# Patient Record
Sex: FEMALE | HISPANIC OR LATINO | Employment: OTHER | URBAN - METROPOLITAN AREA
[De-identification: names, ages, dates, MRNs, and addresses within clinical notes are randomized per-mention and may not be internally consistent; named-entity substitution may affect disease eponyms.]

---

## 2018-11-07 ENCOUNTER — HOSPITAL ENCOUNTER (INPATIENT)
Facility: HOSPITAL | Age: 33
LOS: 2 days | Discharge: HOME | DRG: 638 | End: 2018-11-09
Attending: EMERGENCY MEDICINE | Admitting: INTERNAL MEDICINE
Payer: MEDICAID

## 2018-11-07 DIAGNOSIS — N39.0 URINARY TRACT INFECTION WITHOUT HEMATURIA, SITE UNSPECIFIED: ICD-10-CM

## 2018-11-07 DIAGNOSIS — E11.10 DIABETIC KETOACIDOSIS WITHOUT COMA ASSOCIATED WITH TYPE 2 DIABETES MELLITUS: Primary | ICD-10-CM

## 2018-11-07 PROBLEM — Z79.4 TYPE 2 DIABETES MELLITUS, WITH LONG-TERM CURRENT USE OF INSULIN: Status: ACTIVE | Noted: 2018-11-07

## 2018-11-07 PROBLEM — E87.1 HYPONATREMIA: Status: ACTIVE | Noted: 2018-11-07

## 2018-11-07 PROBLEM — D72.829 LEUKOCYTOSIS: Status: ACTIVE | Noted: 2018-11-07

## 2018-11-07 PROBLEM — R11.2 INTRACTABLE VOMITING WITH NAUSEA: Status: ACTIVE | Noted: 2018-11-07

## 2018-11-07 PROBLEM — E11.9 TYPE 2 DIABETES MELLITUS, WITH LONG-TERM CURRENT USE OF INSULIN: Status: ACTIVE | Noted: 2018-11-07

## 2018-11-07 LAB
ALBUMIN SERPL BCP-MCNC: 3.6 G/DL
ALLENS TEST: ABNORMAL
ALP SERPL-CCNC: 92 U/L
ALT SERPL W/O P-5'-P-CCNC: 17 U/L
ANION GAP SERPL CALC-SCNC: 14 MMOL/L
ANION GAP SERPL CALC-SCNC: 17 MMOL/L
AST SERPL-CCNC: 14 U/L
B-HCG UR QL: NEGATIVE
B-OH-BUTYR BLD STRIP-SCNC: 4.3 MMOL/L
BACTERIA #/AREA URNS AUTO: ABNORMAL /HPF
BASOPHILS # BLD AUTO: 0.06 K/UL
BASOPHILS NFR BLD: 0.4 %
BILIRUB SERPL-MCNC: 1.3 MG/DL
BILIRUB UR QL STRIP: NEGATIVE
BUN SERPL-MCNC: 15 MG/DL
BUN SERPL-MCNC: 18 MG/DL
CALCIUM SERPL-MCNC: 8.6 MG/DL
CALCIUM SERPL-MCNC: 9.5 MG/DL
CHLORIDE SERPL-SCNC: 101 MMOL/L
CHLORIDE SERPL-SCNC: 94 MMOL/L
CLARITY UR REFRACT.AUTO: ABNORMAL
CO2 SERPL-SCNC: 18 MMOL/L
CO2 SERPL-SCNC: 20 MMOL/L
COLOR UR AUTO: YELLOW
CREAT SERPL-MCNC: 0.8 MG/DL
CREAT SERPL-MCNC: 0.9 MG/DL
CTP QC/QA: YES
CTP QC/QA: YES
DELSYS: ABNORMAL
DIFFERENTIAL METHOD: ABNORMAL
EOSINOPHIL # BLD AUTO: 0 K/UL
EOSINOPHIL NFR BLD: 0.2 %
ERYTHROCYTE [DISTWIDTH] IN BLOOD BY AUTOMATED COUNT: 11.7 %
EST. GFR  (AFRICAN AMERICAN): >60 ML/MIN/1.73 M^2
EST. GFR  (AFRICAN AMERICAN): >60 ML/MIN/1.73 M^2
EST. GFR  (NON AFRICAN AMERICAN): >60 ML/MIN/1.73 M^2
EST. GFR  (NON AFRICAN AMERICAN): >60 ML/MIN/1.73 M^2
ESTIMATED AVG GLUCOSE: 237 MG/DL
GLUCOSE SERPL-MCNC: 226 MG/DL
GLUCOSE SERPL-MCNC: 386 MG/DL
GLUCOSE UR QL STRIP: ABNORMAL
HBA1C MFR BLD HPLC: 9.9 %
HCO3 UR-SCNC: 21.3 MMOL/L (ref 24–28)
HCT VFR BLD AUTO: 42.2 %
HGB BLD-MCNC: 14.9 G/DL
HGB UR QL STRIP: ABNORMAL
HYALINE CASTS UR QL AUTO: 0 /LPF
IMM GRANULOCYTES # BLD AUTO: 0.08 K/UL
IMM GRANULOCYTES NFR BLD AUTO: 0.6 %
KETONES UR QL STRIP: ABNORMAL
LACTATE SERPL-SCNC: 1.2 MMOL/L
LEUKOCYTE ESTERASE UR QL STRIP: ABNORMAL
LIPASE SERPL-CCNC: 26 U/L
LYMPHOCYTES # BLD AUTO: 2.7 K/UL
LYMPHOCYTES NFR BLD: 19 %
MCH RBC QN AUTO: 31 PG
MCHC RBC AUTO-ENTMCNC: 35.3 G/DL
MCV RBC AUTO: 88 FL
MICROSCOPIC COMMENT: ABNORMAL
MONOCYTES # BLD AUTO: 0.9 K/UL
MONOCYTES NFR BLD: 6 %
NEUTROPHILS # BLD AUTO: 10.6 K/UL
NEUTROPHILS NFR BLD: 73.8 %
NITRITE UR QL STRIP: NEGATIVE
NRBC BLD-RTO: 0 /100 WBC
PCO2 BLDA: 33 MMHG (ref 35–45)
PH SMN: 7.42 [PH] (ref 7.35–7.45)
PH UR STRIP: 5 [PH] (ref 5–8)
PLATELET # BLD AUTO: 295 K/UL
PMV BLD AUTO: 10.2 FL
PO2 BLDA: 75 MMHG (ref 80–100)
POC BE: -3 MMOL/L
POC MOLECULAR INFLUENZA A AGN: NEGATIVE
POC MOLECULAR INFLUENZA B AGN: NEGATIVE
POC SATURATED O2: 95 % (ref 95–100)
POC TCO2: 22 MMOL/L (ref 23–27)
POCT GLUCOSE: 198 MG/DL (ref 70–110)
POCT GLUCOSE: 235 MG/DL (ref 70–110)
POCT GLUCOSE: 241 MG/DL (ref 70–110)
POCT GLUCOSE: 270 MG/DL (ref 70–110)
POCT GLUCOSE: 273 MG/DL (ref 70–110)
POCT GLUCOSE: 280 MG/DL (ref 70–110)
POCT GLUCOSE: 286 MG/DL (ref 70–110)
POCT GLUCOSE: 352 MG/DL (ref 70–110)
POTASSIUM SERPL-SCNC: 3.3 MMOL/L
POTASSIUM SERPL-SCNC: 4 MMOL/L
PROT SERPL-MCNC: 7.9 G/DL
PROT UR QL STRIP: ABNORMAL
RBC # BLD AUTO: 4.81 M/UL
RBC #/AREA URNS AUTO: 18 /HPF (ref 0–4)
SAMPLE: ABNORMAL
SITE: ABNORMAL
SODIUM SERPL-SCNC: 129 MMOL/L
SODIUM SERPL-SCNC: 135 MMOL/L
SP GR UR STRIP: >=1.03 (ref 1–1.03)
SQUAMOUS #/AREA URNS AUTO: 22 /HPF
URN SPEC COLLECT METH UR: ABNORMAL
WBC # BLD AUTO: 14.38 K/UL
WBC #/AREA URNS AUTO: >100 /HPF (ref 0–5)
YEAST UR QL AUTO: ABNORMAL

## 2018-11-07 PROCEDURE — 99291 CRITICAL CARE FIRST HOUR: CPT

## 2018-11-07 PROCEDURE — 99900035 HC TECH TIME PER 15 MIN (STAT)

## 2018-11-07 PROCEDURE — 25000003 PHARM REV CODE 250: Performed by: PHYSICIAN ASSISTANT

## 2018-11-07 PROCEDURE — 25000003 PHARM REV CODE 250: Performed by: STUDENT IN AN ORGANIZED HEALTH CARE EDUCATION/TRAINING PROGRAM

## 2018-11-07 PROCEDURE — 36415 COLL VENOUS BLD VENIPUNCTURE: CPT

## 2018-11-07 PROCEDURE — 63600175 PHARM REV CODE 636 W HCPCS: Performed by: INTERNAL MEDICINE

## 2018-11-07 PROCEDURE — 96375 TX/PRO/DX INJ NEW DRUG ADDON: CPT

## 2018-11-07 PROCEDURE — S5571 INSULIN DISPOS PEN 3 ML: HCPCS | Performed by: INTERNAL MEDICINE

## 2018-11-07 PROCEDURE — 81025 URINE PREGNANCY TEST: CPT | Performed by: PHYSICIAN ASSISTANT

## 2018-11-07 PROCEDURE — 83036 HEMOGLOBIN GLYCOSYLATED A1C: CPT

## 2018-11-07 PROCEDURE — 87040 BLOOD CULTURE FOR BACTERIA: CPT | Mod: 59

## 2018-11-07 PROCEDURE — 96372 THER/PROPH/DIAG INJ SC/IM: CPT

## 2018-11-07 PROCEDURE — 80053 COMPREHEN METABOLIC PANEL: CPT

## 2018-11-07 PROCEDURE — 20600001 HC STEP DOWN PRIVATE ROOM

## 2018-11-07 PROCEDURE — 63600175 PHARM REV CODE 636 W HCPCS: Performed by: PHYSICIAN ASSISTANT

## 2018-11-07 PROCEDURE — S5571 INSULIN DISPOS PEN 3 ML: HCPCS | Performed by: STUDENT IN AN ORGANIZED HEALTH CARE EDUCATION/TRAINING PROGRAM

## 2018-11-07 PROCEDURE — 85025 COMPLETE CBC W/AUTO DIFF WBC: CPT

## 2018-11-07 PROCEDURE — 99291 CRITICAL CARE FIRST HOUR: CPT | Mod: ,,, | Performed by: EMERGENCY MEDICINE

## 2018-11-07 PROCEDURE — 83605 ASSAY OF LACTIC ACID: CPT

## 2018-11-07 PROCEDURE — S5010 5% DEXTROSE AND 0.45% SALINE: HCPCS | Performed by: STUDENT IN AN ORGANIZED HEALTH CARE EDUCATION/TRAINING PROGRAM

## 2018-11-07 PROCEDURE — 36600 WITHDRAWAL OF ARTERIAL BLOOD: CPT

## 2018-11-07 PROCEDURE — 63600175 PHARM REV CODE 636 W HCPCS: Performed by: STUDENT IN AN ORGANIZED HEALTH CARE EDUCATION/TRAINING PROGRAM

## 2018-11-07 PROCEDURE — 80048 BASIC METABOLIC PNL TOTAL CA: CPT | Mod: 91

## 2018-11-07 PROCEDURE — 80048 BASIC METABOLIC PNL TOTAL CA: CPT

## 2018-11-07 PROCEDURE — 82962 GLUCOSE BLOOD TEST: CPT

## 2018-11-07 PROCEDURE — 87086 URINE CULTURE/COLONY COUNT: CPT

## 2018-11-07 PROCEDURE — 83690 ASSAY OF LIPASE: CPT

## 2018-11-07 PROCEDURE — 82010 KETONE BODYS QUAN: CPT

## 2018-11-07 PROCEDURE — 96361 HYDRATE IV INFUSION ADD-ON: CPT

## 2018-11-07 PROCEDURE — 81001 URINALYSIS AUTO W/SCOPE: CPT

## 2018-11-07 PROCEDURE — 82803 BLOOD GASES ANY COMBINATION: CPT

## 2018-11-07 PROCEDURE — 99223 1ST HOSP IP/OBS HIGH 75: CPT | Mod: ,,, | Performed by: INTERNAL MEDICINE

## 2018-11-07 PROCEDURE — 96374 THER/PROPH/DIAG INJ IV PUSH: CPT | Mod: 59

## 2018-11-07 RX ORDER — ONDANSETRON 2 MG/ML
4 INJECTION INTRAMUSCULAR; INTRAVENOUS
Status: COMPLETED | OUTPATIENT
Start: 2018-11-07 | End: 2018-11-07

## 2018-11-07 RX ORDER — DEXTROSE MONOHYDRATE 100 MG/ML
1000 INJECTION, SOLUTION INTRAVENOUS
Status: DISCONTINUED | OUTPATIENT
Start: 2018-11-07 | End: 2018-11-09 | Stop reason: HOSPADM

## 2018-11-07 RX ORDER — ONDANSETRON 8 MG/1
8 TABLET, ORALLY DISINTEGRATING ORAL EVERY 8 HOURS PRN
Status: DISCONTINUED | OUTPATIENT
Start: 2018-11-07 | End: 2018-11-08

## 2018-11-07 RX ORDER — GLUCAGON 1 MG
1 KIT INJECTION
Status: DISCONTINUED | OUTPATIENT
Start: 2018-11-07 | End: 2018-11-09 | Stop reason: HOSPADM

## 2018-11-07 RX ORDER — INSULIN ASPART 100 [IU]/ML
5 INJECTION, SOLUTION INTRAVENOUS; SUBCUTANEOUS
Status: ON HOLD | COMMUNITY
End: 2019-05-08 | Stop reason: HOSPADM

## 2018-11-07 RX ORDER — CEFTRIAXONE 1 G/1
1 INJECTION, POWDER, FOR SOLUTION INTRAMUSCULAR; INTRAVENOUS
Status: COMPLETED | OUTPATIENT
Start: 2018-11-08 | End: 2018-11-09

## 2018-11-07 RX ORDER — CEFTRIAXONE 1 G/1
1 INJECTION, POWDER, FOR SOLUTION INTRAMUSCULAR; INTRAVENOUS
Status: COMPLETED | OUTPATIENT
Start: 2018-11-07 | End: 2018-11-07

## 2018-11-07 RX ORDER — IBUPROFEN 200 MG
16 TABLET ORAL
Status: DISCONTINUED | OUTPATIENT
Start: 2018-11-07 | End: 2018-11-09 | Stop reason: HOSPADM

## 2018-11-07 RX ORDER — POTASSIUM CHLORIDE 20 MEQ/1
40 TABLET, EXTENDED RELEASE ORAL EVERY 4 HOURS
Status: COMPLETED | OUTPATIENT
Start: 2018-11-07 | End: 2018-11-07

## 2018-11-07 RX ORDER — ENOXAPARIN SODIUM 100 MG/ML
40 INJECTION SUBCUTANEOUS EVERY 24 HOURS
Status: DISCONTINUED | OUTPATIENT
Start: 2018-11-07 | End: 2018-11-09 | Stop reason: HOSPADM

## 2018-11-07 RX ORDER — IBUPROFEN 200 MG
24 TABLET ORAL
Status: DISCONTINUED | OUTPATIENT
Start: 2018-11-07 | End: 2018-11-09 | Stop reason: HOSPADM

## 2018-11-07 RX ORDER — PROMETHAZINE HYDROCHLORIDE 25 MG/1
25 TABLET ORAL EVERY 6 HOURS PRN
Status: DISCONTINUED | OUTPATIENT
Start: 2018-11-07 | End: 2018-11-09 | Stop reason: HOSPADM

## 2018-11-07 RX ORDER — SODIUM CHLORIDE 9 MG/ML
INJECTION, SOLUTION INTRAVENOUS CONTINUOUS
Status: DISCONTINUED | OUTPATIENT
Start: 2018-11-07 | End: 2018-11-07

## 2018-11-07 RX ORDER — DICYCLOMINE HYDROCHLORIDE 10 MG/ML
20 INJECTION INTRAMUSCULAR
Status: COMPLETED | OUTPATIENT
Start: 2018-11-07 | End: 2018-11-07

## 2018-11-07 RX ORDER — INSULIN ASPART 100 [IU]/ML
1-10 INJECTION, SOLUTION INTRAVENOUS; SUBCUTANEOUS
Status: DISCONTINUED | OUTPATIENT
Start: 2018-11-07 | End: 2018-11-09 | Stop reason: HOSPADM

## 2018-11-07 RX ADMIN — DICYCLOMINE HYDROCHLORIDE 20 MG: 20 INJECTION, SOLUTION INTRAMUSCULAR at 02:11

## 2018-11-07 RX ADMIN — SODIUM CHLORIDE 1000 ML: 0.9 INJECTION, SOLUTION INTRAVENOUS at 02:11

## 2018-11-07 RX ADMIN — SODIUM CHLORIDE 3 UNITS/HR: 9 INJECTION, SOLUTION INTRAVENOUS at 03:11

## 2018-11-07 RX ADMIN — INSULIN DETEMIR 30 UNITS: 100 INJECTION, SOLUTION SUBCUTANEOUS at 08:11

## 2018-11-07 RX ADMIN — ENOXAPARIN SODIUM 40 MG: 100 INJECTION SUBCUTANEOUS at 06:11

## 2018-11-07 RX ADMIN — SODIUM CHLORIDE 2.7 UNITS/HR: 9 INJECTION, SOLUTION INTRAVENOUS at 10:11

## 2018-11-07 RX ADMIN — POTASSIUM CHLORIDE: 149 INJECTION, SOLUTION, CONCENTRATE INTRAVENOUS at 06:11

## 2018-11-07 RX ADMIN — SODIUM CHLORIDE 1000 ML: 0.9 INJECTION, SOLUTION INTRAVENOUS at 04:11

## 2018-11-07 RX ADMIN — ONDANSETRON HYDROCHLORIDE 4 MG: 2 INJECTION INTRAMUSCULAR; INTRAVENOUS at 02:11

## 2018-11-07 RX ADMIN — INSULIN DETEMIR 25 UNITS: 100 INJECTION, SOLUTION SUBCUTANEOUS at 10:11

## 2018-11-07 RX ADMIN — CEFTRIAXONE SODIUM 1 G: 1 INJECTION, POWDER, FOR SOLUTION INTRAMUSCULAR; INTRAVENOUS at 03:11

## 2018-11-07 RX ADMIN — POTASSIUM CHLORIDE 40 MEQ: 1500 TABLET, EXTENDED RELEASE ORAL at 10:11

## 2018-11-07 NOTE — ED PROVIDER NOTES
"Encounter Date: 2018       History     Chief Complaint   Patient presents with    Nausea     Pt presented to the ED via pov. Pt c/o nausea and vomiting since .     Emesis     32 y/o WF with history of insulin dependent DM2 presents to the ED c/o n/v since . She reports persistent vomiting and an inability to tolerate po. She last took insulin on Saturday (has not taken any since due to no po intake). She is on levemir 40unit qhs, novolog sliding scale, victoza qam. She reports 1 episode of diarrhea this morning. She endorses subjective fever, chills generalized abdominal pain that she reports is "stuck/cramping" 10/10 and not relieved with emesis, headache, lightheadedness, dry mouth, generalized myalgias. She was treated for a UTI at the end of October (unsure which abx) and continues to have dysuria. She denies any sick contacts. PSHx significant for  x 3. Denies chest pain, SOB, vaginal bleeding, flank pain.       The history is provided by the patient.     Review of patient's allergies indicates:   Allergen Reactions    Bactrim [sulfamethoxazole-trimethoprim] Rash     Past Medical History:   Diagnosis Date    Diabetes mellitus      History reviewed. No pertinent surgical history.  History reviewed. No pertinent family history.  Social History     Tobacco Use    Smoking status: Never Smoker    Smokeless tobacco: Never Used   Substance Use Topics    Alcohol use: No     Frequency: Never    Drug use: No     Review of Systems   Constitutional: Positive for chills and fever (subjective).   HENT: Negative for congestion, rhinorrhea and sore throat.    Eyes: Negative for photophobia and visual disturbance.   Respiratory: Negative for shortness of breath.    Cardiovascular: Negative for chest pain.   Gastrointestinal: Positive for abdominal pain, diarrhea (x1), nausea and vomiting. Negative for constipation.   Genitourinary: Positive for decreased urine volume and dysuria. Negative for " flank pain, hematuria and vaginal bleeding.   Musculoskeletal: Positive for myalgias. Negative for back pain, neck pain and neck stiffness.   Skin: Negative for wound.   Neurological: Positive for light-headedness. Negative for dizziness, weakness, numbness and headaches.   Psychiatric/Behavioral: Negative for confusion.       Physical Exam     Initial Vitals [11/07/18 1303]   BP Pulse Resp Temp SpO2   125/84 92 17 98.6 °F (37 °C) 99 %      MAP       --         Physical Exam    Nursing note and vitals reviewed.  Constitutional: She appears well-developed and well-nourished. She is not diaphoretic. No distress.   Actively vomiting in the room.   HENT:   Head: Normocephalic and atraumatic.   Neck: Normal range of motion. Neck supple.   Cardiovascular: Regular rhythm and normal heart sounds. Tachycardia present.  Exam reveals no gallop and no friction rub.    No murmur heard.  Pulmonary/Chest: Breath sounds normal. She has no wheezes. She has no rhonchi. She has no rales.   Abdominal: Soft. Bowel sounds are normal. She exhibits no distension. There is tenderness (generalized). There is no rebound, no guarding and no CVA tenderness.   Musculoskeletal: Normal range of motion.   Neurological: She is alert and oriented to person, place, and time.   Skin: Skin is warm and dry. No rash noted. No erythema.   Psychiatric: She has a normal mood and affect.         ED Course   Critical Care  Date/Time: 11/7/2018 2:00 PM  Performed by: Isha Jeffers PA-C  Authorized by: Merced Seaman MD   Direct patient critical care time: 20 minutes  Ordering / reviewing critical care time: 5 minutes  Documentation critical care time: 5 minutes  Consulting other physicians critical care time: 5 minutes  Total critical care time (exclusive of procedural time) : 35 minutes  Critical care was necessary to treat or prevent imminent or life-threatening deterioration of the following conditions: metabolic crisis.  Critical care was time  spent personally by me on the following activities: development of treatment plan with patient or surrogate, evaluation of patient's response to treatment, examination of patient, obtaining history from patient or surrogate, ordering and review of laboratory studies and re-evaluation of patient's condition.        Labs Reviewed   CBC W/ AUTO DIFFERENTIAL - Abnormal; Notable for the following components:       Result Value    WBC 14.38 (*)     Immature Granulocytes 0.6 (*)     Gran # (ANC) 10.6 (*)     Immature Grans (Abs) 0.08 (*)     Gran% 73.8 (*)     All other components within normal limits   COMPREHENSIVE METABOLIC PANEL - Abnormal; Notable for the following components:    Sodium 129 (*)     Chloride 94 (*)     CO2 18 (*)     Glucose 386 (*)     Total Bilirubin 1.3 (*)     Anion Gap 17 (*)     All other components within normal limits   BETA - HYDROXYBUTYRATE, SERUM - Abnormal; Notable for the following components:    Beta-Hydroxybutyrate 4.3 (*)     All other components within normal limits   URINALYSIS, REFLEX TO URINE CULTURE - Abnormal; Notable for the following components:    Appearance, UA Cloudy (*)     Specific Gravity, UA >=1.030 (*)     Protein, UA 2+ (*)     Glucose, UA 3+ (*)     Ketones, UA 3+ (*)     Occult Blood UA 3+ (*)     Leukocytes, UA 3+ (*)     All other components within normal limits    Narrative:     Preferred Collection Type->Urine, Clean Catch   URINALYSIS MICROSCOPIC - Abnormal; Notable for the following components:    RBC, UA 18 (*)     WBC, UA >100 (*)     Bacteria, UA Moderate (*)     All other components within normal limits    Narrative:     Preferred Collection Type->Urine, Clean Catch   BASIC METABOLIC PANEL - Abnormal; Notable for the following components:    Sodium 135 (*)     Potassium 3.3 (*)     CO2 20 (*)     Glucose 226 (*)     Calcium 8.6 (*)     All other components within normal limits   HEMOGLOBIN A1C - Abnormal; Notable for the following components:    Hemoglobin  A1C 9.9 (*)     Estimated Avg Glucose 237 (*)     All other components within normal limits    Narrative:     ADD-ON [facility] #[id number] PER NATALIIA KENT MD 17:25  11/07/2018    POCT GLUCOSE - Abnormal; Notable for the following components:    POCT Glucose 352 (*)     All other components within normal limits   POCT GLUCOSE - Abnormal; Notable for the following components:    POCT Glucose 280 (*)     All other components within normal limits   POCT GLUCOSE - Abnormal; Notable for the following components:    POCT Glucose 235 (*)     All other components within normal limits   POCT GLUCOSE - Abnormal; Notable for the following components:    POCT Glucose 198 (*)     All other components within normal limits   ISTAT PROCEDURE - Abnormal; Notable for the following components:    POC PCO2 33.0 (*)     POC PO2 75 (*)     POC HCO3 21.3 (*)     POC TCO2 22 (*)     All other components within normal limits   CULTURE, URINE   CULTURE, BLOOD   CULTURE, BLOOD   LIPASE   HEMOGLOBIN A1C   LACTIC ACID, PLASMA   POCT URINE PREGNANCY   POCT INFLUENZA A/B MOLECULAR   POCT GLUCOSE MONITORING CONTINUOUS   POCT GLUCOSE MONITORING CONTINUOUS          Imaging Results    None                APC / Resident Notes:   32 y/o WF with history of insulin dependent DM2 presents to the ED c/o n/v since Sunday. VSS. Actively vomiting in the ED. Abdomen soft with generalized tenderness. No CVA tenderness. Dry mucus membranes. DDx includes but is not limited to gastroenteritis, DKA, UTI, influenza, pancreatitis, viral syndrome. Will get labs.     Influenza negative. UA shows infection. Urine culture pending. UPT negative. Leukocytosis noted with WBC 14.38. Hyperglycemia noted at 386, bicarb low, anion gap 17. Beta hydroxybutyrate 4.3. Lipase normal.    Consistent with DKA. Given IV rocephin for UTI. Started in insulin drip. Will admit to internal medicine for further management.                 Clinical Impression:   The primary encounter diagnosis was  Diabetic ketoacidosis without coma associated with type 2 diabetes mellitus. A diagnosis of Urinary tract infection without hematuria, site unspecified was also pertinent to this visit.      Disposition:   Disposition: Admitted  Condition: Fair                        Isha Jeffers PA-C  11/07/18 1850       Isha Jeffers PA-C  11/08/18 0911

## 2018-11-07 NOTE — ED TRIAGE NOTES
"Patient states she has been vomiting since Sunday,concerned about weakness and dry lips. States she has been vomiting "all morning" IDDM, no insulin since Yesterday  "

## 2018-11-07 NOTE — SUBJECTIVE & OBJECTIVE
Past Medical History:   Diagnosis Date    Diabetes mellitus        History reviewed. No pertinent surgical history.    Review of patient's allergies indicates:   Allergen Reactions    Bactrim [sulfamethoxazole-trimethoprim] Rash       No current facility-administered medications on file prior to encounter.      Current Outpatient Medications on File Prior to Encounter   Medication Sig    insulin aspart U-100 (NOVOLOG) 100 unit/mL injection Inject into the skin 3 (three) times daily before meals.    insulin detemir U-100 (LEVEMIR) 100 unit/mL injection Inject 45 Units into the skin every evening.    liraglutide (VICTOZA 2-ANAHI SUBQ) Inject 8.1 mg into the skin once daily.     Family History     None        Tobacco Use    Smoking status: Never Smoker    Smokeless tobacco: Never Used   Substance and Sexual Activity    Alcohol use: No     Frequency: Never    Drug use: No    Sexual activity: Not on file     Review of Systems   Constitutional: Positive for chills, fatigue and fever. Negative for activity change (Decreased).   HENT: Positive for sore throat and trouble swallowing. Negative for congestion, postnasal drip and rhinorrhea.         Patient complaining of dry mouth   Eyes: Negative for photophobia and visual disturbance.   Respiratory: Negative for cough, chest tightness and shortness of breath.    Cardiovascular: Negative for chest pain and palpitations.   Gastrointestinal: Positive for abdominal pain, nausea and vomiting.   Genitourinary: Positive for decreased urine volume and dysuria.   Musculoskeletal: Positive for arthralgias and myalgias.   Skin: Negative for rash and wound.   Neurological: Positive for dizziness, weakness, light-headedness and headaches. Negative for syncope.   Psychiatric/Behavioral: Negative for agitation and confusion.     Objective:     Vital Signs (Most Recent):  Temp: 98.6 °F (37 °C) (11/07/18 1303)  Pulse: 98 (11/07/18 1730)  Resp: 20 (11/07/18 1730)  BP: 110/64 (11/07/18  1730)  SpO2: 99 % (11/07/18 1730) Vital Signs (24h Range):  Temp:  [98.6 °F (37 °C)] 98.6 °F (37 °C)  Pulse:  [92-98] 98  Resp:  [17-20] 20  SpO2:  [98 %-99 %] 99 %  BP: (110-154)/(64-84) 110/64     Weight: 93 kg (205 lb)  Body mass index is 31.17 kg/m².    Physical Exam   Constitutional: She is oriented to person, place, and time. She appears well-developed and well-nourished.   HENT:   Head: Normocephalic and atraumatic.   Patient threw up during interview so mouth was moist with emesis but lips were dry on exam   Eyes: Conjunctivae and EOM are normal. Pupils are equal, round, and reactive to light. No scleral icterus.   Neck: Normal range of motion. Neck supple.   Cardiovascular: Normal rate, regular rhythm, normal heart sounds and intact distal pulses.   No murmur heard.  Pulmonary/Chest: Effort normal and breath sounds normal.   Abdominal: Soft. Bowel sounds are normal.   Musculoskeletal: Normal range of motion.   Neurological: She is alert and oriented to person, place, and time.   Skin: Skin is warm and dry.   Psychiatric: She has a normal mood and affect. Her behavior is normal. Judgment and thought content normal.   Vitals reviewed.        CRANIAL NERVES     CN III, IV, VI   Pupils are equal, round, and reactive to light.  Extraocular motions are normal.        Significant Labs:   A1C: No results for input(s): HGBA1C in the last 4320 hours.  CBC:   Recent Labs   Lab 11/07/18  1347   WBC 14.38*   HGB 14.9   HCT 42.2        CMP:   Recent Labs   Lab 11/07/18  1347   *   K 4.0   CL 94*   CO2 18*   *   BUN 18   CREATININE 0.9   CALCIUM 9.5   PROT 7.9   ALBUMIN 3.6   BILITOT 1.3*   ALKPHOS 92   AST 14   ALT 17   ANIONGAP 17*   EGFRNONAA >60.0     Lipase:   Recent Labs   Lab 11/07/18  1347   LIPASE 26       Significant Imaging: n/a

## 2018-11-07 NOTE — ED NOTES
Paged Dr Love to report blood glucose 198, decreased insulin drip to 1.5 units/hour per protocol.

## 2018-11-07 NOTE — ED NOTES
Patient identifiers verified and correct for Ms Perdomo  C/C: Vomiting since Sunday  APPEARANCE: awake and alert in NAD.  SKIN: warm, dry and intact. No breakdown or bruising.  MUSCULOSKELETAL: Patient moving all extremities spontaneously, no obvious swelling or deformities noted. Ambulates independently.  RESPIRATORY: Denies shortness of breath.Respirations unlabored.   CARDIAC: Denies CP, 2+ distal pulses; no peripheral edema  ABDOMEN: ABdomen round, genall over abd pain, positive nausea and dry heaves  : voids spontaneously, positive burning with urination and pain   Neurologic: AAO x 4; follows commands equal strength in all extremities; denies numbness/tingling. Denies dizziness Positive gen weakness

## 2018-11-08 PROBLEM — E87.6 HYPOKALEMIA: Status: ACTIVE | Noted: 2018-11-08

## 2018-11-08 PROBLEM — K52.9 GASTROENTERITIS: Status: ACTIVE | Noted: 2018-11-08

## 2018-11-08 LAB
ALBUMIN SERPL BCP-MCNC: 3 G/DL
ALP SERPL-CCNC: 73 U/L
ALT SERPL W/O P-5'-P-CCNC: 12 U/L
ANION GAP SERPL CALC-SCNC: 13 MMOL/L
ANION GAP SERPL CALC-SCNC: 7 MMOL/L
ANION GAP SERPL CALC-SCNC: 8 MMOL/L
ANION GAP SERPL CALC-SCNC: 9 MMOL/L
AST SERPL-CCNC: 10 U/L
BACTERIA UR CULT: NORMAL
BACTERIA UR CULT: NORMAL
BASOPHILS # BLD AUTO: 0.05 K/UL
BASOPHILS NFR BLD: 0.5 %
BILIRUB SERPL-MCNC: 1.1 MG/DL
BUN SERPL-MCNC: 10 MG/DL
BUN SERPL-MCNC: 11 MG/DL
BUN SERPL-MCNC: 12 MG/DL
BUN SERPL-MCNC: 13 MG/DL
BUN SERPL-MCNC: 8 MG/DL
BUN SERPL-MCNC: 9 MG/DL
CALCIUM SERPL-MCNC: 8.3 MG/DL
CALCIUM SERPL-MCNC: 8.4 MG/DL
CALCIUM SERPL-MCNC: 8.5 MG/DL
CHLORIDE SERPL-SCNC: 102 MMOL/L
CHLORIDE SERPL-SCNC: 103 MMOL/L
CHLORIDE SERPL-SCNC: 104 MMOL/L
CHLORIDE SERPL-SCNC: 104 MMOL/L
CHLORIDE SERPL-SCNC: 105 MMOL/L
CHLORIDE SERPL-SCNC: 107 MMOL/L
CO2 SERPL-SCNC: 16 MMOL/L
CO2 SERPL-SCNC: 18 MMOL/L
CO2 SERPL-SCNC: 20 MMOL/L
CO2 SERPL-SCNC: 20 MMOL/L
CO2 SERPL-SCNC: 22 MMOL/L
CREAT SERPL-MCNC: 0.6 MG/DL
CREAT SERPL-MCNC: 0.7 MG/DL
DIFFERENTIAL METHOD: ABNORMAL
EOSINOPHIL # BLD AUTO: 0.1 K/UL
EOSINOPHIL NFR BLD: 1 %
ERYTHROCYTE [DISTWIDTH] IN BLOOD BY AUTOMATED COUNT: 11.6 %
EST. GFR  (AFRICAN AMERICAN): >60 ML/MIN/1.73 M^2
EST. GFR  (NON AFRICAN AMERICAN): >60 ML/MIN/1.73 M^2
GLUCOSE SERPL-MCNC: 164 MG/DL
GLUCOSE SERPL-MCNC: 181 MG/DL
GLUCOSE SERPL-MCNC: 181 MG/DL
GLUCOSE SERPL-MCNC: 184 MG/DL
GLUCOSE SERPL-MCNC: 199 MG/DL
GLUCOSE SERPL-MCNC: 232 MG/DL
GLUCOSE SERPL-MCNC: 244 MG/DL
GLUCOSE SERPL-MCNC: 314 MG/DL
HCT VFR BLD AUTO: 39.7 %
HGB BLD-MCNC: 14.2 G/DL
IMM GRANULOCYTES # BLD AUTO: 0.05 K/UL
IMM GRANULOCYTES NFR BLD AUTO: 0.5 %
LYMPHOCYTES # BLD AUTO: 3.7 K/UL
LYMPHOCYTES NFR BLD: 35.7 %
MAGNESIUM SERPL-MCNC: 1.9 MG/DL
MCH RBC QN AUTO: 31.8 PG
MCHC RBC AUTO-ENTMCNC: 35.8 G/DL
MCV RBC AUTO: 89 FL
MONOCYTES # BLD AUTO: 0.9 K/UL
MONOCYTES NFR BLD: 8.7 %
NEUTROPHILS # BLD AUTO: 5.5 K/UL
NEUTROPHILS NFR BLD: 53.6 %
NRBC BLD-RTO: 0 /100 WBC
PLATELET # BLD AUTO: 260 K/UL
PMV BLD AUTO: 10.1 FL
POCT GLUCOSE: 160 MG/DL (ref 70–110)
POCT GLUCOSE: 175 MG/DL (ref 70–110)
POCT GLUCOSE: 179 MG/DL (ref 70–110)
POCT GLUCOSE: 193 MG/DL (ref 70–110)
POCT GLUCOSE: 229 MG/DL (ref 70–110)
POCT GLUCOSE: 265 MG/DL (ref 70–110)
POTASSIUM SERPL-SCNC: 3.3 MMOL/L
POTASSIUM SERPL-SCNC: 3.4 MMOL/L
POTASSIUM SERPL-SCNC: 3.6 MMOL/L
POTASSIUM SERPL-SCNC: 3.6 MMOL/L
POTASSIUM SERPL-SCNC: 3.7 MMOL/L
POTASSIUM SERPL-SCNC: 3.8 MMOL/L
PROT SERPL-MCNC: 6.7 G/DL
RBC # BLD AUTO: 4.47 M/UL
SODIUM SERPL-SCNC: 131 MMOL/L
SODIUM SERPL-SCNC: 132 MMOL/L
SODIUM SERPL-SCNC: 132 MMOL/L
SODIUM SERPL-SCNC: 133 MMOL/L
SODIUM SERPL-SCNC: 135 MMOL/L
WBC # BLD AUTO: 10.33 K/UL

## 2018-11-08 PROCEDURE — 20600001 HC STEP DOWN PRIVATE ROOM

## 2018-11-08 PROCEDURE — 63600175 PHARM REV CODE 636 W HCPCS: Performed by: STUDENT IN AN ORGANIZED HEALTH CARE EDUCATION/TRAINING PROGRAM

## 2018-11-08 PROCEDURE — 25000003 PHARM REV CODE 250: Performed by: STUDENT IN AN ORGANIZED HEALTH CARE EDUCATION/TRAINING PROGRAM

## 2018-11-08 PROCEDURE — 36415 COLL VENOUS BLD VENIPUNCTURE: CPT

## 2018-11-08 PROCEDURE — 80048 BASIC METABOLIC PNL TOTAL CA: CPT | Mod: 91

## 2018-11-08 PROCEDURE — 85025 COMPLETE CBC W/AUTO DIFF WBC: CPT

## 2018-11-08 PROCEDURE — 80053 COMPREHEN METABOLIC PANEL: CPT

## 2018-11-08 PROCEDURE — 80048 BASIC METABOLIC PNL TOTAL CA: CPT

## 2018-11-08 PROCEDURE — 99232 SBSQ HOSP IP/OBS MODERATE 35: CPT | Mod: ,,, | Performed by: INTERNAL MEDICINE

## 2018-11-08 PROCEDURE — 83735 ASSAY OF MAGNESIUM: CPT

## 2018-11-08 RX ORDER — POTASSIUM CHLORIDE 20 MEQ/15ML
50 SOLUTION ORAL ONCE
Status: COMPLETED | OUTPATIENT
Start: 2018-11-08 | End: 2018-11-08

## 2018-11-08 RX ORDER — ONDANSETRON 8 MG/1
8 TABLET, ORALLY DISINTEGRATING ORAL EVERY 8 HOURS
Status: DISCONTINUED | OUTPATIENT
Start: 2018-11-08 | End: 2018-11-09 | Stop reason: HOSPADM

## 2018-11-08 RX ORDER — CITALOPRAM 20 MG/1
20 TABLET, FILM COATED ORAL DAILY
COMMUNITY

## 2018-11-08 RX ORDER — INSULIN ASPART 100 [IU]/ML
5 INJECTION, SOLUTION INTRAVENOUS; SUBCUTANEOUS
Status: DISCONTINUED | OUTPATIENT
Start: 2018-11-08 | End: 2018-11-09

## 2018-11-08 RX ORDER — INSULIN ASPART 100 [IU]/ML
0-5 INJECTION, SOLUTION INTRAVENOUS; SUBCUTANEOUS
Status: DISCONTINUED | OUTPATIENT
Start: 2018-11-08 | End: 2018-11-08

## 2018-11-08 RX ORDER — PANTOPRAZOLE SODIUM 40 MG/1
40 TABLET, DELAYED RELEASE ORAL DAILY
Status: DISCONTINUED | OUTPATIENT
Start: 2018-11-08 | End: 2018-11-09 | Stop reason: HOSPADM

## 2018-11-08 RX ORDER — POTASSIUM CHLORIDE 20 MEQ/15ML
40 SOLUTION ORAL ONCE
Status: COMPLETED | OUTPATIENT
Start: 2018-11-08 | End: 2018-11-08

## 2018-11-08 RX ORDER — BLOOD-GLUCOSE CONTROL, NORMAL
1 EACH MISCELLANEOUS 3 TIMES DAILY
COMMUNITY

## 2018-11-08 RX ORDER — INSULIN ASPART 100 [IU]/ML
4 INJECTION, SOLUTION INTRAVENOUS; SUBCUTANEOUS
Status: DISCONTINUED | OUTPATIENT
Start: 2018-11-08 | End: 2018-11-08

## 2018-11-08 RX ORDER — SODIUM CHLORIDE 9 MG/ML
INJECTION, SOLUTION INTRAVENOUS CONTINUOUS
Status: DISCONTINUED | OUTPATIENT
Start: 2018-11-08 | End: 2018-11-09 | Stop reason: HOSPADM

## 2018-11-08 RX ADMIN — CEFTRIAXONE 1 G: 1 INJECTION, POWDER, FOR SOLUTION INTRAMUSCULAR; INTRAVENOUS at 04:11

## 2018-11-08 RX ADMIN — INSULIN ASPART 2 UNITS: 100 INJECTION, SOLUTION INTRAVENOUS; SUBCUTANEOUS at 08:11

## 2018-11-08 RX ADMIN — SODIUM CHLORIDE: 0.9 INJECTION, SOLUTION INTRAVENOUS at 05:11

## 2018-11-08 RX ADMIN — POTASSIUM CHLORIDE 50 MEQ: 20 SOLUTION ORAL at 11:11

## 2018-11-08 RX ADMIN — INSULIN ASPART 2 UNITS: 100 INJECTION, SOLUTION INTRAVENOUS; SUBCUTANEOUS at 11:11

## 2018-11-08 RX ADMIN — PROMETHAZINE HYDROCHLORIDE 25 MG: 25 TABLET ORAL at 12:11

## 2018-11-08 RX ADMIN — INSULIN ASPART 6 UNITS: 100 INJECTION, SOLUTION INTRAVENOUS; SUBCUTANEOUS at 04:11

## 2018-11-08 RX ADMIN — PANTOPRAZOLE SODIUM 40 MG: 40 TABLET, DELAYED RELEASE ORAL at 11:11

## 2018-11-08 RX ADMIN — POTASSIUM CHLORIDE 40 MEQ: 1.5 SOLUTION ORAL at 04:11

## 2018-11-08 RX ADMIN — ONDANSETRON 8 MG: 8 TABLET, ORALLY DISINTEGRATING ORAL at 09:11

## 2018-11-08 RX ADMIN — ONDANSETRON 8 MG: 8 TABLET, ORALLY DISINTEGRATING ORAL at 04:11

## 2018-11-08 RX ADMIN — INSULIN ASPART 1 UNITS: 100 INJECTION, SOLUTION INTRAVENOUS; SUBCUTANEOUS at 08:11

## 2018-11-08 RX ADMIN — SODIUM CHLORIDE 1000 ML: 0.9 INJECTION, SOLUTION INTRAVENOUS at 09:11

## 2018-11-08 RX ADMIN — INSULIN ASPART 5 UNITS: 100 INJECTION, SOLUTION INTRAVENOUS; SUBCUTANEOUS at 04:11

## 2018-11-08 RX ADMIN — ENOXAPARIN SODIUM 40 MG: 100 INJECTION SUBCUTANEOUS at 04:11

## 2018-11-08 NOTE — H&P
[facility] Hospital Medicine  History & Physical    Patient Name: Petrona Perdomo  MRN: [id number]  Admission Date: 11/7/2018  Attending Physician: Em Love MD   Primary Care Provider: Roberto Christian NP    [facility] Medicine Team: [facility] HOSP MED 1 Celine Garay MD     Patient information was obtained from patient and ER records.     Subjective:     Principal Problem:Diabetic ketoacidosis without coma associated with type 2 diabetes mellitus    Chief Complaint:   Chief Complaint   Patient presents with    Nausea     Pt presented to the ED via pov. Pt c/o nausea and vomiting since Sunday.     Emesis        HPI: 34 y/o F patient with diabetes x 3 yrs on victoza 8.1 mg, novolog SSI, and detemir 45 U nightly, presented to ED with 3 days of nausea, vomiting, abd pain, dysuria, and decreased urine output. Decreased urine output and dysuria started 4 days prior to admission. Emesis started 3 days prior to admission. Emesis is non bilious and non bloody at home. In the ED emesis became bilious. Emesis had consistency of digested food, however as vomiting persisted and patient's PO intake consisted of only fluids, emesis became clear. Patient also complaining of subjective fevers, chills, headaches, lightheadedness, weakness, fatigue, generalized body aches, and dry mouth. The patient presented to the ED once she felt that she was too weak and dehydrated to remain at home.      Past Medical History:   Diagnosis Date    Diabetes mellitus        History reviewed. No pertinent surgical history.    Review of patient's allergies indicates:   Allergen Reactions    Bactrim [sulfamethoxazole-trimethoprim] Rash       No current facility-administered medications on file prior to encounter.      Current Outpatient Medications on File Prior to Encounter   Medication Sig    insulin aspart U-100 (NOVOLOG) 100 unit/mL injection Inject into the skin 3 (three) times daily before meals.    insulin  detemir U-100 (LEVEMIR) 100 unit/mL injection Inject 45 Units into the skin every evening.    liraglutide (VICTOZA 2-ANAHI SUBQ) Inject 8.1 mg into the skin once daily.     Family History     None        Tobacco Use    Smoking status: Never Smoker    Smokeless tobacco: Never Used   Substance and Sexual Activity    Alcohol use: No     Frequency: Never    Drug use: No    Sexual activity: Not on file     Review of Systems   Constitutional: Positive for chills, fatigue and fever. Negative for activity change (Decreased).   HENT: Positive for sore throat and trouble swallowing. Negative for congestion, postnasal drip and rhinorrhea.         Patient complaining of dry mouth   Eyes: Negative for photophobia and visual disturbance.   Respiratory: Negative for cough, chest tightness and shortness of breath.    Cardiovascular: Negative for chest pain and palpitations.   Gastrointestinal: Positive for abdominal pain, nausea and vomiting.   Genitourinary: Positive for decreased urine volume and dysuria.   Musculoskeletal: Positive for arthralgias and myalgias.   Skin: Negative for rash and wound.   Neurological: Positive for dizziness, weakness, light-headedness and headaches. Negative for syncope.   Psychiatric/Behavioral: Negative for agitation and confusion.     Objective:     Vital Signs (Most Recent):  Temp: 98.6 °F (37 °C) (11/07/18 1303)  Pulse: 98 (11/07/18 1730)  Resp: 20 (11/07/18 1730)  BP: 110/64 (11/07/18 1730)  SpO2: 99 % (11/07/18 1730) Vital Signs (24h Range):  Temp:  [98.6 °F (37 °C)] 98.6 °F (37 °C)  Pulse:  [92-98] 98  Resp:  [17-20] 20  SpO2:  [98 %-99 %] 99 %  BP: (110-154)/(64-84) 110/64     Weight: 93 kg (205 lb)  Body mass index is 31.17 kg/m².    Physical Exam   Constitutional: She is oriented to person, place, and time. She appears well-developed and well-nourished.   HENT:   Head: Normocephalic and atraumatic.   Patient threw up during interview so mouth was moist with emesis but lips were dry on  exam   Eyes: Conjunctivae and EOM are normal. Pupils are equal, round, and reactive to light. No scleral icterus.   Neck: Normal range of motion. Neck supple.   Cardiovascular: Normal rate, regular rhythm, normal heart sounds and intact distal pulses.   No murmur heard.  Pulmonary/Chest: Effort normal and breath sounds normal.   Abdominal: Soft. Bowel sounds are normal.   Musculoskeletal: Normal range of motion.   Neurological: She is alert and oriented to person, place, and time.   Skin: Skin is warm and dry.   Psychiatric: She has a normal mood and affect. Her behavior is normal. Judgment and thought content normal.   Vitals reviewed.        CRANIAL NERVES     CN III, IV, VI   Pupils are equal, round, and reactive to light.  Extraocular motions are normal.        Significant Labs:   A1C: No results for input(s): HGBA1C in the last 4320 hours.  CBC:   Recent Labs   Lab 11/07/18  1347   WBC 14.38*   HGB 14.9   HCT 42.2        CMP:   Recent Labs   Lab 11/07/18  1347   *   K 4.0   CL 94*   CO2 18*   *   BUN 18   CREATININE 0.9   CALCIUM 9.5   PROT 7.9   ALBUMIN 3.6   BILITOT 1.3*   ALKPHOS 92   AST 14   ALT 17   ANIONGAP 17*   EGFRNONAA >60.0     Lipase:   Recent Labs   Lab 11/07/18  1347   LIPASE 26       Significant Imaging: n/a    Assessment/Plan:     * Diabetic ketoacidosis without coma associated with type 2 diabetes mellitus    Patient with diabetes x 3 years on victoza, low dose SSI, and levemir nightly. Complaining of dysuria and 5 days of intractable nausea and vomiting. Patient holding insulin with prolonged vomiting and decreased PO intake   WBC 14.38, Na 129, K 4.0, Cl 94, HCO3 18, BUN/Cr 18/0.9, , Beta HB 43, Hgb A1C 9.9, pH 7.4, Lactate Pending    Plan  - insulin drip protocol   -s/p 2L of NS in ED, starting D5 1/2 NS with 20 mEq K per L @ 250mL/hr(  and trending down)  - no need for bicarb as pH > 7.0      Type 2 diabetes mellitus, with long-term current use of insulin     Patient with diabetes x 3 yrs. Home meds of victoza 8.1 mg, novolog SSI, and detemir 45 U nightly.     - currently on insulin drip protocol for DKA  - will continue to hold home medications      Intractable vomiting with nausea    - Continue with PRN zofran and phenergan        UTI (urinary tract infection)    Patient with dysuria. UA leukocyte 3+ with moderate bacteria and negative nitrates.    - urine culture pending  - s/p ceftriaxone dose 1/3     Leukocytosis    Possibly 2/2 presumed UTI vs reactive leukocytosis     - LA Pending  - S/P 2L NS. Currently on maintenance fluids @ 250 mL/hr       Hyponatremia    See DKA       VTE Risk Mitigation (From admission, onward)        Ordered     enoxaparin injection 40 mg  Daily      11/07/18 1630     IP VTE HIGH RISK PATIENT  Once      11/07/18 1630     Place OSMAN hose  Until discontinued      11/07/18 1504     Place sequential compression device  Until discontinued      11/07/18 1504             Celine Garay MD  Department of Hospital Medicine   [facility]

## 2018-11-08 NOTE — ASSESSMENT & PLAN NOTE
Patient with diabetes x 3 yrs. Home meds of victoza 8.1 mg, novolog SSI, and detemir 45 U nightly. Patient on clear liquid diet.    - low dose SSI while on clear liquid diet  - will consider prandial insulin when diet is advanced  - will continue to hold home medications

## 2018-11-08 NOTE — ASSESSMENT & PLAN NOTE
Patient with diabetes x 3 yrs. Home meds of victoza 8.1 mg, novolog SSI, and detemir 45 U nightly.     - currently on insulin drip protocol for DKA  - will continue to hold home medications

## 2018-11-08 NOTE — ASSESSMENT & PLAN NOTE
Possibly 2/2 presumed UTI vs reactive leukocytosis     - LA Pending  - S/P 2L NS. Currently on maintenance fluids @ 250 mL/hr

## 2018-11-08 NOTE — ASSESSMENT & PLAN NOTE
Patient with dysuria. UA leukocyte 3+ with moderate bacteria and negative nitrates.    - urine culture pending  - s/p ceftriaxone dose 2/3

## 2018-11-08 NOTE — ASSESSMENT & PLAN NOTE
Patient with diabetes x 3 years on victoza, low dose SSI, and levemir nightly. Complaining of dysuria and 5 days of intractable nausea and vomiting. Patient holding insulin with prolonged vomiting and decreased PO intake   WBC 14.38, Na 129, K 4.0, Cl 94, HCO3 18, BUN/Cr 18/0.9, , Beta HB 43, Hgb A1C 9.9, pH 7.4, Lactate Pending    Plan  - insulin drip protocol   -s/p 2L of NS in ED, starting D5 1/2 NS with 20 mEq K per L @ 250mL/hr(  and trending down)  - no need for bicarb as pH > 7.0

## 2018-11-08 NOTE — ASSESSMENT & PLAN NOTE
Patient presented to [facility] on day 5 of intractable N/V. Now complaining of loose stools x 2 days. Presumed viral gastroenteritis. Patient on Ceftriaxone, some concerns for C.diff.    - IVF for dehydration   - Scheduled Zofran and PRN Phenergan for nausea   - Stool Studies for C.diff as patient on Abx

## 2018-11-08 NOTE — NURSING
Insulin gtt dc'd per order.  IVF also dc'd per order.  Next accucheck will be at midnight and q4hr thereafter.

## 2018-11-08 NOTE — ASSESSMENT & PLAN NOTE
WBC 14.38 on admission Possibly 2/2 presumed UTI vs reactive leukocytosis. 11/08 WBC WNL 10.33. 11/07 LA. BCx NGTD x1    - Urine Cx pending  - will continue to monitor

## 2018-11-08 NOTE — SUBJECTIVE & OBJECTIVE
Interval History: Anion Ion Gap closed overnight, Insulin drip discontinued. Patient still with persistent nausea and intermittent vomiting. Complaining of loose stools.    Review of Systems   Constitutional: Positive for activity change, appetite change and fever. Negative for chills and fatigue.   HENT: Negative for congestion, postnasal drip and rhinorrhea.    Eyes: Negative for photophobia and visual disturbance.   Respiratory: Negative for cough and shortness of breath.    Cardiovascular: Negative for chest pain and palpitations.   Gastrointestinal: Positive for abdominal pain, diarrhea and vomiting. Negative for abdominal distention and nausea.   Genitourinary: Negative for decreased urine volume and dysuria.   Musculoskeletal: Positive for arthralgias and myalgias.   Skin: Negative for rash and wound.   Neurological: Positive for weakness. Negative for dizziness and light-headedness.   Psychiatric/Behavioral: Negative for agitation, behavioral problems and confusion.     Objective:     Vital Signs (Most Recent):  Temp: 98.6 °F (37 °C) (11/08/18 1258)  Pulse: 91 (11/08/18 1258)  Resp: 16 (11/08/18 1258)  BP: (!) 143/83 (11/08/18 1258)  SpO2: 99 % (11/08/18 1258) Vital Signs (24h Range):  Temp:  [96.8 °F (36 °C)-98.6 °F (37 °C)] 98.6 °F (37 °C)  Pulse:  [] 91  Resp:  [16-20] 16  SpO2:  [95 %-100 %] 99 %  BP: (110-162)/() 143/83     Weight: 93 kg (205 lb)  Body mass index is 31.17 kg/m².  No intake or output data in the 24 hours ending 11/08/18 1530   Physical Exam   Constitutional: She is oriented to person, place, and time. She appears well-developed and well-nourished.   HENT:   Head: Normocephalic and atraumatic.   Dry mucous membranes and lips    Eyes: Conjunctivae and EOM are normal. No scleral icterus.   Neck: Normal range of motion. Neck supple.   Cardiovascular: Normal rate, regular rhythm, normal heart sounds and intact distal pulses.   Pulmonary/Chest: Effort normal and breath sounds  normal.   Abdominal: Soft. Bowel sounds are normal.   Musculoskeletal: Normal range of motion.   Neurological: She is alert and oriented to person, place, and time.   Skin: Skin is warm and dry.   Psychiatric: She has a normal mood and affect. Her behavior is normal. Judgment and thought content normal.   Vitals reviewed.      Significant Labs:   A1C:   Recent Labs   Lab 11/07/18  1347   HGBA1C 9.9*     ABGs:   Recent Labs   Lab 11/07/18  1727   PH 7.419   PCO2 33.0*   HCO3 21.3*   POCSATURATED 95   BE -3     Blood Culture:   Recent Labs   Lab 11/07/18  1708 11/07/18  1720   LABBLOO No Growth to date No Growth to date     CBC:   Recent Labs   Lab 11/07/18  1347 11/08/18  0503   WBC 14.38* 10.33   HGB 14.9 14.2   HCT 42.2 39.7    260     CMP:   Recent Labs   Lab 11/07/18  1347  11/08/18  0503 11/08/18  0754 11/08/18  1159   *   < > 133*  133* 133* 131*   K 4.0   < > 3.6  3.6 3.7 3.7   CL 94*   < > 102  102 104 105   CO2 18*   < > 22*  22* 20* 18*   *   < > 181*  181* 184* 199*   BUN 18   < > 11  11 11 10   CREATININE 0.9   < > 0.7  0.7 0.6 0.6   CALCIUM 9.5   < > 8.5*  8.5* 8.5* 8.4*   PROT 7.9  --  6.7  --   --    ALBUMIN 3.6  --  3.0*  --   --    BILITOT 1.3*  --  1.1*  --   --    ALKPHOS 92  --  73  --   --    AST 14  --  10  --   --    ALT 17  --  12  --   --    ANIONGAP 17*   < > 9  9 9 8   EGFRNONAA >60.0   < > >60.0  >60.0 >60.0 >60.0    < > = values in this interval not displayed.     POCT Glucose:   Recent Labs   Lab 11/08/18  0000 11/08/18  0748 11/08/18  1116   POCTGLUCOSE 229* 175* 193*     Urine Culture: No results for input(s): LABURIN in the last 48 hours.    Significant Imaging: I have reviewed and interpreted all pertinent imaging results/findings within the past 24 hours.

## 2018-11-08 NOTE — ASSESSMENT & PLAN NOTE
Patient with diabetes x 3 years on victoza, low dose SSI, and levemir nightly. Complaining of dysuria and 5 days of intractable nausea and vomiting. Patient holding insulin with prolonged vomiting and decreased PO intake   Anion Gap on Admission 17. 11/08 Anion Gap 8. Na on admission 129. Na now WNL, 133. K on admission 4.0. K now 3.6. . Patient still with nausea and intermittent vomiting. Patient on clear liquid diet.     Plan  - low dose SSI with meals and nightly  - 45 U levemir nightly   - NS IVF for continued dehydration   - Q4 CMP's   - Replace K PRN

## 2018-11-08 NOTE — PROGRESS NOTES
[facility] Hospital Medicine  Progress Note    Patient Name: Petrona Perdomo  MRN: [id number]  Patient Class: IP- Inpatient   Admission Date: 11/7/2018  Length of Stay: 1 days  Attending Physician: Em Love MD  Primary Care Provider: Roberto Christian NP    [facility] Medicine Team: [facility] HOSP MED 1 Celine Garay MD    Subjective:     Principal Problem:Diabetic ketoacidosis without coma associated with type 2 diabetes mellitus    HPI:  32 y/o F patient with diabetes x 3 yrs on victoza 8.1 mg, novolog SSI, and detemir 45 U nightly, presented to ED with 3 days of nausea, vomiting, abd pain, dysuria, and decreased urine output. Decreased urine output and dysuria started 4 days prior to admission. Emesis started 3 days prior to admission. Emesis is non bilious and non bloody at home. In the ED emesis became bilious. Emesis had consistency of digested food, however as vomiting persisted and patient's PO intake consisted of only fluids, emesis became clear. Patient also complaining of subjective fevers, chills, headaches, lightheadedness, weakness, fatigue, generalized body aches, and dry mouth. The patient presented to the ED once she felt that she was too weak and dehydrated to remain at home.      Hospital Course:  No notes on file    Interval History: Anion Ion Gap closed overnight, Insulin drip discontinued. Patient still with persistent nausea and intermittent vomiting. Complaining of loose stools.    Review of Systems   Constitutional: Positive for activity change, appetite change and fever. Negative for chills and fatigue.   HENT: Negative for congestion, postnasal drip and rhinorrhea.    Eyes: Negative for photophobia and visual disturbance.   Respiratory: Negative for cough and shortness of breath.    Cardiovascular: Negative for chest pain and palpitations.   Gastrointestinal: Positive for abdominal pain, diarrhea and vomiting. Negative for abdominal distention and nausea.    Genitourinary: Negative for decreased urine volume and dysuria.   Musculoskeletal: Positive for arthralgias and myalgias.   Skin: Negative for rash and wound.   Neurological: Positive for weakness. Negative for dizziness and light-headedness.   Psychiatric/Behavioral: Negative for agitation, behavioral problems and confusion.     Objective:     Vital Signs (Most Recent):  Temp: 98.6 °F (37 °C) (11/08/18 1258)  Pulse: 91 (11/08/18 1258)  Resp: 16 (11/08/18 1258)  BP: (!) 143/83 (11/08/18 1258)  SpO2: 99 % (11/08/18 1258) Vital Signs (24h Range):  Temp:  [96.8 °F (36 °C)-98.6 °F (37 °C)] 98.6 °F (37 °C)  Pulse:  [] 91  Resp:  [16-20] 16  SpO2:  [95 %-100 %] 99 %  BP: (110-162)/() 143/83     Weight: 93 kg (205 lb)  Body mass index is 31.17 kg/m².  No intake or output data in the 24 hours ending 11/08/18 1530   Physical Exam   Constitutional: She is oriented to person, place, and time. She appears well-developed and well-nourished.   HENT:   Head: Normocephalic and atraumatic.   Dry mucous membranes and lips    Eyes: Conjunctivae and EOM are normal. No scleral icterus.   Neck: Normal range of motion. Neck supple.   Cardiovascular: Normal rate, regular rhythm, normal heart sounds and intact distal pulses.   Pulmonary/Chest: Effort normal and breath sounds normal.   Abdominal: Soft. Bowel sounds are normal.   Musculoskeletal: Normal range of motion.   Neurological: She is alert and oriented to person, place, and time.   Skin: Skin is warm and dry.   Psychiatric: She has a normal mood and affect. Her behavior is normal. Judgment and thought content normal.   Vitals reviewed.      Significant Labs:   A1C:   Recent Labs   Lab 11/07/18  1347   HGBA1C 9.9*     ABGs:   Recent Labs   Lab 11/07/18  1727   PH 7.419   PCO2 33.0*   HCO3 21.3*   POCSATURATED 95   BE -3     Blood Culture:   Recent Labs   Lab 11/07/18  1708 11/07/18  1720   LABBLOO No Growth to date No Growth to date     CBC:   Recent Labs   Lab  11/07/18  1347 11/08/18  0503   WBC 14.38* 10.33   HGB 14.9 14.2   HCT 42.2 39.7    260     CMP:   Recent Labs   Lab 11/07/18  1347  11/08/18  0503 11/08/18  0754 11/08/18  1159   *   < > 133*  133* 133* 131*   K 4.0   < > 3.6  3.6 3.7 3.7   CL 94*   < > 102  102 104 105   CO2 18*   < > 22*  22* 20* 18*   *   < > 181*  181* 184* 199*   BUN 18   < > 11  11 11 10   CREATININE 0.9   < > 0.7  0.7 0.6 0.6   CALCIUM 9.5   < > 8.5*  8.5* 8.5* 8.4*   PROT 7.9  --  6.7  --   --    ALBUMIN 3.6  --  3.0*  --   --    BILITOT 1.3*  --  1.1*  --   --    ALKPHOS 92  --  73  --   --    AST 14  --  10  --   --    ALT 17  --  12  --   --    ANIONGAP 17*   < > 9  9 9 8   EGFRNONAA >60.0   < > >60.0  >60.0 >60.0 >60.0    < > = values in this interval not displayed.     POCT Glucose:   Recent Labs   Lab 11/08/18  0000 11/08/18  0748 11/08/18  1116   POCTGLUCOSE 229* 175* 193*     Urine Culture: No results for input(s): LABURIN in the last 48 hours.    Significant Imaging: I have reviewed and interpreted all pertinent imaging results/findings within the past 24 hours.    Assessment/Plan:      * Diabetic ketoacidosis without coma associated with type 2 diabetes mellitus    Patient with diabetes x 3 years on victoza, low dose SSI, and levemir nightly. Complaining of dysuria and 5 days of intractable nausea and vomiting. Patient holding insulin with prolonged vomiting and decreased PO intake   Anion Gap on Admission 17. 11/08 Anion Gap 8. Na on admission 129. Na now WNL, 133. K on admission 4.0. K now 3.6. . Patient still with nausea and intermittent vomiting. Patient on clear liquid diet.     Plan  - low dose SSI with meals and nightly  - 45 U levemir nightly   - NS IVF for continued dehydration   - Q4 CMP's   - Replace K PRN     Hypokalemia    See DKA       Gastroenteritis    Patient presented to [facility] on day 5 of intractable N/V. Now complaining of loose stools x 2 days. Presumed viral gastroenteritis.  Patient on Ceftriaxone, some concerns for C.diff.    - IVF for dehydration   - Scheduled Zofran and PRN Phenergan for nausea   - Stool Studies for C.diff as patient on Abx        Type 2 diabetes mellitus, with long-term current use of insulin    Patient with diabetes x 3 yrs. Home meds of victoza 8.1 mg, novolog SSI, and detemir 45 U nightly. Patient on clear liquid diet.    - low dose SSI while on clear liquid diet  - will consider prandial insulin when diet is advanced  - will continue to hold home medications      Intractable vomiting with nausea    - Continue with Zofran 8 mg TID   - Continue with PRN phenergan        UTI (urinary tract infection)    Patient with dysuria. UA leukocyte 3+ with moderate bacteria and negative nitrates.    - urine culture pending  - s/p ceftriaxone dose 2/3     Leukocytosis    WBC 14.38 on admission Possibly 2/2 presumed UTI vs reactive leukocytosis. 11/08 WBC WNL 10.33. 11/07 LA. BCx NGTD x1    - Urine Cx pending  - will continue to monitor        Hyponatremia    See DKA       VTE Risk Mitigation (From admission, onward)        Ordered     enoxaparin injection 40 mg  Daily      11/07/18 1630     IP VTE HIGH RISK PATIENT  Once      11/07/18 1630     Place OSMAN hose  Until discontinued      11/07/18 1504     Place sequential compression device  Until discontinued      11/07/18 1504              Celine Garay MD  Department of Hospital Medicine   [facility]

## 2018-11-08 NOTE — HPI
32 y/o F patient with diabetes x 3 yrs on victoza 8.1 mg, novolog SSI, and detemir 45 U nightly, presented to ED with 3 days of nausea, vomiting, abd pain, dysuria, and decreased urine output. Decreased urine output and dysuria started 4 days prior to admission. Emesis started 3 days prior to admission. Emesis is non bilious and non bloody at home. In the ED emesis became bilious. Emesis had consistency of digested food, however as vomiting persisted and patient's PO intake consisted of only fluids, emesis became clear. Patient also complaining of subjective fevers, chills, headaches, lightheadedness, weakness, fatigue, generalized body aches, and dry mouth. The patient presented to the ED once she felt that she was too weak and dehydrated to remain at home.

## 2018-11-08 NOTE — ASSESSMENT & PLAN NOTE
Patient with dysuria. UA leukocyte 3+ with moderate bacteria and negative nitrates.    - urine culture pending  - s/p ceftriaxone dose 1/3

## 2018-11-08 NOTE — PLAN OF CARE
PCP [facility] in [place], [place]  No future appointments.   Lives w three kids.  Has caro      11/08/18 1114   Discharge Assessment   Assessment Type Discharge Planning Assessment   Confirmed/corrected address and phone number on facesheet? Yes   Assessment information obtained from? Patient   Expected Length of Stay (days) 2   Communicated expected length of stay with patient/caregiver yes   Prior to hospitilization cognitive status: Alert/Oriented   Prior to hospitalization functional status: Independent   Current cognitive status: Alert/Oriented   Current Functional Status: Independent   Lives With child(micah), dependent   Able to Return to Prior Arrangements yes   Is patient able to care for self after discharge? Yes   Who are your caregiver(s) and their phone number(s)? [facility] susan escobar   [phone]   Patient's perception of discharge disposition home or selfcare   Readmission Within The Last 30 Days no previous admission in last 30 days   Patient currently being followed by outpatient case management? No   Patient currently receives any other outside agency services? No   Equipment Currently Used at Home none   Do you have any problems affording any of your prescribed medications? No   Is the patient taking medications as prescribed? yes   Does the patient have transportation home? Yes   Transportation Available family or friend will provide   Does the patient receive services at the [facility]mad[facility] Clinic? No   Discharge Plan A Home with family   Discharge Plan B Home with family   Patient/Family In Agreement With Plan yes

## 2018-11-08 NOTE — PLAN OF CARE
Roberto Christian NP PCP - General Internal Medicine 05/31/2016 End  5/31/16   Phone: [phone]; Fax: [phone]        APPOINTMENT MADE ON November 14 AT 1015AM  WILL FAX PROGRESS NOTE OVER TO YIN CHRISTIAN

## 2018-11-08 NOTE — PLAN OF CARE
Problem: Patient Care Overview  Goal: Plan of Care Review  Outcome: Ongoing (interventions implemented as appropriate)  VSS. A/Ox4.  No complaints of pain.  No acute events overnight. Safety maintained.  All questions answered. Patient updated on plan of care.  Will continue to monitor.

## 2018-11-09 VITALS
DIASTOLIC BLOOD PRESSURE: 76 MMHG | TEMPERATURE: 98 F | OXYGEN SATURATION: 100 % | BODY MASS INDEX: 31.07 KG/M2 | RESPIRATION RATE: 17 BRPM | WEIGHT: 205 LBS | SYSTOLIC BLOOD PRESSURE: 141 MMHG | HEART RATE: 83 BPM | HEIGHT: 68 IN

## 2018-11-09 PROBLEM — E87.6 HYPOKALEMIA: Status: RESOLVED | Noted: 2018-11-08 | Resolved: 2018-11-09

## 2018-11-09 PROBLEM — E87.1 HYPONATREMIA: Status: RESOLVED | Noted: 2018-11-07 | Resolved: 2018-11-09

## 2018-11-09 PROBLEM — D72.829 LEUKOCYTOSIS: Status: RESOLVED | Noted: 2018-11-07 | Resolved: 2018-11-09

## 2018-11-09 LAB
ALBUMIN SERPL BCP-MCNC: 2.3 G/DL
ALP SERPL-CCNC: 64 U/L
ALT SERPL W/O P-5'-P-CCNC: 10 U/L
ANION GAP SERPL CALC-SCNC: 6 MMOL/L
AST SERPL-CCNC: 10 U/L
B-OH-BUTYR BLD STRIP-SCNC: 0.1 MMOL/L
BASOPHILS # BLD AUTO: 0.05 K/UL
BASOPHILS NFR BLD: 0.7 %
BILIRUB SERPL-MCNC: 0.5 MG/DL
BUN SERPL-MCNC: 7 MG/DL
BUN SERPL-MCNC: 7 MG/DL
BUN SERPL-MCNC: 8 MG/DL
CALCIUM SERPL-MCNC: 8.2 MG/DL
CALCIUM SERPL-MCNC: 8.2 MG/DL
CALCIUM SERPL-MCNC: 8.4 MG/DL
CHLORIDE SERPL-SCNC: 107 MMOL/L
CHLORIDE SERPL-SCNC: 112 MMOL/L
CHLORIDE SERPL-SCNC: 112 MMOL/L
CO2 SERPL-SCNC: 20 MMOL/L
CO2 SERPL-SCNC: 20 MMOL/L
CO2 SERPL-SCNC: 22 MMOL/L
CREAT SERPL-MCNC: 0.6 MG/DL
CREAT SERPL-MCNC: 0.6 MG/DL
CREAT SERPL-MCNC: 0.7 MG/DL
DIFFERENTIAL METHOD: ABNORMAL
EOSINOPHIL # BLD AUTO: 0.1 K/UL
EOSINOPHIL NFR BLD: 1.9 %
ERYTHROCYTE [DISTWIDTH] IN BLOOD BY AUTOMATED COUNT: 11.9 %
EST. GFR  (AFRICAN AMERICAN): >60 ML/MIN/1.73 M^2
EST. GFR  (NON AFRICAN AMERICAN): >60 ML/MIN/1.73 M^2
GLUCOSE SERPL-MCNC: 148 MG/DL
GLUCOSE SERPL-MCNC: 148 MG/DL
GLUCOSE SERPL-MCNC: 214 MG/DL
HCT VFR BLD AUTO: 34.3 %
HGB BLD-MCNC: 11.7 G/DL
IMM GRANULOCYTES # BLD AUTO: 0.03 K/UL
IMM GRANULOCYTES NFR BLD AUTO: 0.4 %
LYMPHOCYTES # BLD AUTO: 3 K/UL
LYMPHOCYTES NFR BLD: 43.5 %
MAGNESIUM SERPL-MCNC: 1.9 MG/DL
MCH RBC QN AUTO: 31.5 PG
MCHC RBC AUTO-ENTMCNC: 34.1 G/DL
MCV RBC AUTO: 92 FL
MONOCYTES # BLD AUTO: 0.7 K/UL
MONOCYTES NFR BLD: 9.5 %
NEUTROPHILS # BLD AUTO: 3 K/UL
NEUTROPHILS NFR BLD: 44 %
NRBC BLD-RTO: 0 /100 WBC
PLATELET # BLD AUTO: 202 K/UL
PMV BLD AUTO: 10.4 FL
POCT GLUCOSE: 117 MG/DL (ref 70–110)
POCT GLUCOSE: 180 MG/DL (ref 70–110)
POTASSIUM SERPL-SCNC: 3.4 MMOL/L
POTASSIUM SERPL-SCNC: 4.2 MMOL/L
POTASSIUM SERPL-SCNC: 4.2 MMOL/L
PROT SERPL-MCNC: 5.3 G/DL
RBC # BLD AUTO: 3.72 M/UL
SODIUM SERPL-SCNC: 135 MMOL/L
SODIUM SERPL-SCNC: 138 MMOL/L
SODIUM SERPL-SCNC: 138 MMOL/L
WBC # BLD AUTO: 6.87 K/UL

## 2018-11-09 PROCEDURE — 36415 COLL VENOUS BLD VENIPUNCTURE: CPT

## 2018-11-09 PROCEDURE — 90472 IMMUNIZATION ADMIN EACH ADD: CPT | Performed by: INTERNAL MEDICINE

## 2018-11-09 PROCEDURE — 99238 HOSP IP/OBS DSCHRG MGMT 30/<: CPT | Mod: ,,, | Performed by: INTERNAL MEDICINE

## 2018-11-09 PROCEDURE — 25000003 PHARM REV CODE 250: Performed by: STUDENT IN AN ORGANIZED HEALTH CARE EDUCATION/TRAINING PROGRAM

## 2018-11-09 PROCEDURE — 90670 PCV13 VACCINE IM: CPT | Performed by: INTERNAL MEDICINE

## 2018-11-09 PROCEDURE — 90686 IIV4 VACC NO PRSV 0.5 ML IM: CPT | Performed by: INTERNAL MEDICINE

## 2018-11-09 PROCEDURE — 63600175 PHARM REV CODE 636 W HCPCS: Performed by: STUDENT IN AN ORGANIZED HEALTH CARE EDUCATION/TRAINING PROGRAM

## 2018-11-09 PROCEDURE — 82010 KETONE BODYS QUAN: CPT

## 2018-11-09 PROCEDURE — 90471 IMMUNIZATION ADMIN: CPT | Performed by: INTERNAL MEDICINE

## 2018-11-09 PROCEDURE — 63600175 PHARM REV CODE 636 W HCPCS: Performed by: INTERNAL MEDICINE

## 2018-11-09 PROCEDURE — 80053 COMPREHEN METABOLIC PANEL: CPT

## 2018-11-09 PROCEDURE — 3E02340 INTRODUCTION OF INFLUENZA VACCINE INTO MUSCLE, PERCUTANEOUS APPROACH: ICD-10-PCS | Performed by: INTERNAL MEDICINE

## 2018-11-09 PROCEDURE — 83735 ASSAY OF MAGNESIUM: CPT

## 2018-11-09 PROCEDURE — 3E0234Z INTRODUCTION OF SERUM, TOXOID AND VACCINE INTO MUSCLE, PERCUTANEOUS APPROACH: ICD-10-PCS | Performed by: INTERNAL MEDICINE

## 2018-11-09 PROCEDURE — 85025 COMPLETE CBC W/AUTO DIFF WBC: CPT

## 2018-11-09 RX ORDER — CETIRIZINE HYDROCHLORIDE 5 MG/1
10 TABLET ORAL DAILY
Status: DISCONTINUED | OUTPATIENT
Start: 2018-11-09 | End: 2018-11-09 | Stop reason: HOSPADM

## 2018-11-09 RX ORDER — POTASSIUM CHLORIDE 20 MEQ/15ML
50 SOLUTION ORAL ONCE
Status: COMPLETED | OUTPATIENT
Start: 2018-11-09 | End: 2018-11-09

## 2018-11-09 RX ORDER — INSULIN ASPART 100 [IU]/ML
7 INJECTION, SOLUTION INTRAVENOUS; SUBCUTANEOUS
Status: DISCONTINUED | OUTPATIENT
Start: 2018-11-09 | End: 2018-11-09 | Stop reason: HOSPADM

## 2018-11-09 RX ORDER — CETIRIZINE HYDROCHLORIDE 10 MG/1
10 TABLET ORAL DAILY
Refills: 0 | Status: ON HOLD | COMMUNITY
Start: 2018-11-09 | End: 2019-05-08 | Stop reason: HOSPADM

## 2018-11-09 RX ORDER — ONDANSETRON 8 MG/1
8 TABLET, ORALLY DISINTEGRATING ORAL EVERY 8 HOURS PRN
Qty: 15 TABLET | Refills: 0 | Status: ON HOLD | OUTPATIENT
Start: 2018-11-09 | End: 2019-05-08 | Stop reason: HOSPADM

## 2018-11-09 RX ORDER — OMEPRAZOLE 20 MG/1
20 CAPSULE, DELAYED RELEASE ORAL DAILY
Qty: 15 CAPSULE | Refills: 0 | Status: ON HOLD | OUTPATIENT
Start: 2018-11-09 | End: 2019-05-08 | Stop reason: HOSPADM

## 2018-11-09 RX ORDER — FLUCONAZOLE 150 MG/1
150 TABLET ORAL ONCE
Status: COMPLETED | OUTPATIENT
Start: 2018-11-09 | End: 2018-11-09

## 2018-11-09 RX ORDER — INSULIN ASPART 100 [IU]/ML
7 INJECTION, SOLUTION INTRAVENOUS; SUBCUTANEOUS
Status: CANCELLED
Start: 2018-11-09

## 2018-11-09 RX ORDER — PANTOPRAZOLE SODIUM 40 MG/1
40 TABLET, DELAYED RELEASE ORAL DAILY
Qty: 15 TABLET | Refills: 0 | Status: CANCELLED | OUTPATIENT
Start: 2018-11-09

## 2018-11-09 RX ADMIN — PNEUMOCOCCAL 13-VALENT CONJUGATE VACCINE 0.5 ML: 2.2; 2.2; 2.2; 2.2; 2.2; 4.4; 2.2; 2.2; 2.2; 2.2; 2.2; 2.2; 2.2 INJECTION, SUSPENSION INTRAMUSCULAR at 12:11

## 2018-11-09 RX ADMIN — FLUCONAZOLE 150 MG: 150 TABLET ORAL at 12:11

## 2018-11-09 RX ADMIN — POTASSIUM CHLORIDE 50 MEQ: 20 SOLUTION ORAL at 07:11

## 2018-11-09 RX ADMIN — INSULIN ASPART 7 UNITS: 100 INJECTION, SOLUTION INTRAVENOUS; SUBCUTANEOUS at 11:11

## 2018-11-09 RX ADMIN — INSULIN ASPART 2 UNITS: 100 INJECTION, SOLUTION INTRAVENOUS; SUBCUTANEOUS at 11:11

## 2018-11-09 RX ADMIN — SODIUM CHLORIDE: 0.9 INJECTION, SOLUTION INTRAVENOUS at 01:11

## 2018-11-09 RX ADMIN — INFLUENZA A VIRUS A/MICHIGAN/45/2015 X-275 (H1N1) ANTIGEN (FORMALDEHYDE INACTIVATED), INFLUENZA A VIRUS A/SINGAPORE/INFIMH-16-0019/2016 IVR-186 (H3N2) ANTIGEN (FORMALDEHYDE INACTIVATED), INFLUENZA B VIRUS B/PHUKET/3073/2013 ANTIGEN (FORMALDEHYDE INACTIVATED), AND INFLUENZA B VIRUS B/MARYLAND/15/2016 BX-69A ANTIGEN (FORMALDEHYDE INACTIVATED) 0.5 ML: 15; 15; 15; 15 INJECTION, SUSPENSION INTRAMUSCULAR at 12:11

## 2018-11-09 RX ADMIN — CEFTRIAXONE 1 G: 1 INJECTION, POWDER, FOR SOLUTION INTRAMUSCULAR; INTRAVENOUS at 12:11

## 2018-11-09 RX ADMIN — PANTOPRAZOLE SODIUM 40 MG: 40 TABLET, DELAYED RELEASE ORAL at 09:11

## 2018-11-09 RX ADMIN — SODIUM CHLORIDE: 0.9 INJECTION, SOLUTION INTRAVENOUS at 09:11

## 2018-11-09 RX ADMIN — CETIRIZINE HYDROCHLORIDE 10 MG: 5 TABLET ORAL at 10:11

## 2018-11-09 NOTE — ASSESSMENT & PLAN NOTE
Patient with diabetes x 3 yrs. Home meds of victoza 8.1 mg, novolog SSI, and detemir 40 U nightly. Patient on clear liquid diet.    - moderate dose SSI while on clear liquid diet  - will consider prandial insulin when diet is advanced  - will continue to hold home medications

## 2018-11-09 NOTE — PLAN OF CARE
No future appointments.  See avs for appt  APPOINTMENT MADE ON November 14 AT 1015AM    BROOKLYNN JOHNSON NP     11/09/18 1126   Final Note   Assessment Type Final Discharge Note   Anticipated Discharge Disposition Home   Hospital Follow Up  Appt(s) scheduled? Yes   Discharge plans and expectations educations in teach back method with documentation complete? Yes   Right Care Referral Info   Post Acute Recommendation No Care

## 2018-11-09 NOTE — ASSESSMENT & PLAN NOTE
Patient presented to [facility] on day 5 of intractable N/V. Now complaining of loose stools x 2 days. Presumed viral gastroenteritis.     - IVF for dehydration   - Scheduled Zofran and PRN Phenergan for nausea   - improving

## 2018-11-09 NOTE — ASSESSMENT & PLAN NOTE
Patient with dysuria. UA leukocyte 3+ with moderate bacteria and negative nitrates.    - urine culture pending  - s/p ceftriaxone dose 3/3  - still complaining of itching , given 1 does fluconazole 150 mg.

## 2018-11-09 NOTE — PROGRESS NOTES
"  Food & Nutrition  Education    Diet Education: Diabetic (A1C 9.9)  Time Spent: 15 minutes  Learners: Patient    Nutrition Education provided with handouts. All questions and concerns answered. Dietitian's contact information provided.   Comments: Pt reports she has been educated before, but could use a "refresher". Pt verbalized understanding.     Follow-Up: 11/16    Please re-consult as needed.    Thanks!      "

## 2018-11-09 NOTE — SUBJECTIVE & OBJECTIVE
Interval History: tolerating food, no nausea/vomitng, has mild diarrhea, no abdominal pain.   Review of Systems   Constitutional: Negative for activity change, appetite change, chills, fatigue and fever.   HENT: Positive for congestion and sore throat. Negative for postnasal drip and rhinorrhea.    Eyes: Negative for photophobia and visual disturbance.   Respiratory: Negative for cough and shortness of breath.    Cardiovascular: Negative for chest pain and palpitations.   Gastrointestinal: Positive for diarrhea. Negative for abdominal distention, abdominal pain, nausea and vomiting.   Genitourinary: Negative for decreased urine volume and dysuria.   Musculoskeletal: Negative for arthralgias and myalgias.   Skin: Negative for rash and wound.   Neurological: Negative for dizziness, weakness and light-headedness.   Psychiatric/Behavioral: Negative for agitation, behavioral problems and confusion.     Objective:     Vital Signs (Most Recent):  Temp: 98.5 °F (36.9 °C) (11/09/18 0818)  Pulse: 81 (11/09/18 0818)  Resp: 16 (11/09/18 0818)  BP: 122/80 (11/09/18 0818)  SpO2: 99 % (11/09/18 0818) Vital Signs (24h Range):  Temp:  [98 °F (36.7 °C)-98.8 °F (37.1 °C)] 98.5 °F (36.9 °C)  Pulse:  [72-91] 81  Resp:  [16-18] 16  SpO2:  [90 %-99 %] 99 %  BP: (103-143)/(55-83) 122/80     Weight: 93 kg (205 lb)  Body mass index is 31.17 kg/m².    Intake/Output Summary (Last 24 hours) at 11/9/2018 1126  Last data filed at 11/9/2018 0236  Gross per 24 hour   Intake 125 ml   Output --   Net 125 ml      Physical Exam   Constitutional: She is oriented to person, place, and time. She appears well-developed and well-nourished.   HENT:   Head: Normocephalic and atraumatic.   Dry mucous membranes and lips    Eyes: Conjunctivae and EOM are normal. No scleral icterus.   Neck: Normal range of motion. Neck supple.   Cardiovascular: Normal rate, regular rhythm, normal heart sounds and intact distal pulses.   Pulmonary/Chest: Effort normal and breath  sounds normal.   Abdominal: Soft. Bowel sounds are normal.   Musculoskeletal: Normal range of motion.   Neurological: She is alert and oriented to person, place, and time.   Skin: Skin is warm and dry.   Psychiatric: She has a normal mood and affect. Her behavior is normal. Judgment and thought content normal.   Vitals reviewed.      Significant Labs:   A1C:   Recent Labs   Lab 11/07/18  1347   HGBA1C 9.9*     ABGs:   Recent Labs   Lab 11/07/18  1727   PH 7.419   PCO2 33.0*   HCO3 21.3*   POCSATURATED 95   BE -3     Blood Culture:   Recent Labs   Lab 11/07/18  1708 11/07/18  1720   LABBLOO No Growth to date  No Growth to date No Growth to date  No Growth to date     CBC:   Recent Labs   Lab 11/07/18  1347 11/08/18  0503 11/09/18  0424   WBC 14.38* 10.33 6.87   HGB 14.9 14.2 11.7*   HCT 42.2 39.7 34.3*    260 202     CMP:   Recent Labs   Lab 11/07/18  1347  11/08/18  0503  11/08/18  2005 11/08/18  2342 11/09/18  0424   *   < > 133*  133*   < > 135* 135* 138  138   K 4.0   < > 3.6  3.6   < > 3.3* 3.4* 4.2  4.2   CL 94*   < > 102  102   < > 107 107 112*  112*   CO2 18*   < > 22*  22*   < > 20* 22* 20*  20*   *   < > 181*  181*   < > 164* 214* 148*  148*   BUN 18   < > 11  11   < > 8 8 7  7   CREATININE 0.9   < > 0.7  0.7   < > 0.6 0.7 0.6  0.6   CALCIUM 9.5   < > 8.5*  8.5*   < > 8.4* 8.4* 8.2*  8.2*   PROT 7.9  --  6.7  --   --   --  5.3*   ALBUMIN 3.6  --  3.0*  --   --   --  2.3*   BILITOT 1.3*  --  1.1*  --   --   --  0.5   ALKPHOS 92  --  73  --   --   --  64   AST 14  --  10  --   --   --  10   ALT 17  --  12  --   --   --  10   ANIONGAP 17*   < > 9  9   < > 8 6* 6*  6*   EGFRNONAA >60.0   < > >60.0  >60.0   < > >60.0 >60.0 >60.0  >60.0    < > = values in this interval not displayed.     POCT Glucose:   Recent Labs   Lab 11/08/18  1623 11/08/18  2034 11/09/18  0800   POCTGLUCOSE 265* 179* 117*     Urine Culture:   Recent Labs   Lab 11/07/18  1403   LABURIN Multiple  organisms isolated. None in predominance.  Repeat if  clinically necessary.       Significant Imaging: I have reviewed and interpreted all pertinent imaging results/findings within the past 24 hours.        CRANIAL NERVES     CN III, IV, VI   Extraocular motions are normal.

## 2018-11-09 NOTE — DISCHARGE INSTRUCTIONS
APPOINTMENT MADE ON November 14 AT 1015AM    ROBERTO CHRISTIAN NP    Thank you for choosing [facility] for your medical care. The primary doctor who is taking care of you at the time of your discharge is Em Love MD.     You were admitted to the hospital with Diabetic ketoacidosis without coma associated with type 2 diabetes mellitus.     Please note your discharge instructions, including diet/activity restrictions, follow-up appointments, and medication changes.  If you have any questions about your medical issues, prescriptions, or any other questions, please feel free to contact the [facility] Medicine Dept at [phone] and we will help.    If you are previously with Home health, outpatient PT/OT or under a therapy program, you are cleared to return to those programs.    Please direct all long term medication refills and follow up to your primary care provider, Roberto Christian NP. Thank you again for letting us take care of your health care needs.

## 2018-11-09 NOTE — HOSPITAL COURSE
Admitted with DKA and UTI,put in insulin gtt and switch to sub Q insulin, glucose improved, patient still complaining of diarrhea which is improving , denied nausea/vomiting, urinary symptoms improved after 3 does of rocephin, still having itching, will give 1 dose of fluconazole before discharge, should follow up with PCP in 1 week.

## 2018-11-09 NOTE — PHARMACY MED REC
"Admission Medication Reconciliation - Pharmacy Consult Note    The home medication history was taken by Linda Martines, Pharmacy Technician.  Based on information gathered and subsequent review by the clinical pharmacist, the items below may need attention.     You may go to "Admission" then "Reconcile Home Medications" tabs to review and/or act upon these items.     Potentially problematic discrepancies with current MAR  o Patient IS taking the following which was not ordered upon admit  o Citalopram 20 mg PO daily     Please address this information as you see fit.  Feel free to contact us if you have any questions or require assistance.    Loree Garvin, PharmD, BCPS  [id number]     PTA Medications   Medication Sig    blood sugar diagnostic Strp 1 strip by Misc.(Non-Drug; Combo Route) route 3 (three) times daily.    citalopram (CELEXA) 20 MG tablet Take 20 mg by mouth once daily.    insulin aspart U-100 (NOVOLOG) 100 unit/mL injection Inject 5 Units into the skin 3 (three) times daily before meals. Plus sliding scale    insulin detemir U-100 (LEVEMIR) 100 unit/mL injection Inject 40 Units into the skin every evening.     lancets 30 gauge Misc 1 lancet by Misc.(Non-Drug; Combo Route) route 3 (three) times daily.    liraglutide (VICTOZA 2-ANAHI SUBQ) Inject 1.8 mg into the skin once daily.      .    .            "

## 2018-11-09 NOTE — DISCHARGE SUMMARY
[facility] Hospital Medicine  Discharge Summary      Patient Name: Petrona Perdomo  MRN: [id number]  Admission Date: 11/7/2018  Hospital Length of Stay: 2 days  Discharge Date and Time:  11/09/2018 11:33 AM  Attending Physician: Em Love MD   Discharging Provider: BRITTANI Porter  Primary Care Provider: Roberto Christian NP  Hospital Medicine Team: [facility] MED 1 BRITTANI Porter    HPI:   32 y/o F patient with diabetes x 3 yrs on victoza 8.1 mg, novolog SSI, and detemir 45 U nightly, presented to ED with 3 days of nausea, vomiting, abd pain, dysuria, and decreased urine output. Decreased urine output and dysuria started 4 days prior to admission. Emesis started 3 days prior to admission. Emesis is non bilious and non bloody at home. In the ED emesis became bilious. Emesis had consistency of digested food, however as vomiting persisted and patient's PO intake consisted of only fluids, emesis became clear. Patient also complaining of subjective fevers, chills, headaches, lightheadedness, weakness, fatigue, generalized body aches, and dry mouth. The patient presented to the ED once she felt that she was too weak and dehydrated to remain at home.      * No surgery found *      Hospital Course:   Admitted with DKA and UTI,put in insulin gtt and switch to sub Q insulin, glucose improved, patient still complaining of diarrhea which is improving , denied nausea/vomiting, urinary symptoms improved after 3 does of rocephin, still having itching, will give 1 dose of fluconazole before discharge, should follow up with PCP in 1 week.       Physical Exam   Constitutional: She is oriented to person, place, and time. She appears well-developed and well-nourished.   HENT:   Head: Normocephalic and atraumatic.    Eyes: Conjunctivae and EOM are normal. No scleral icterus.   Neck: Normal range of motion. Neck supple.   Cardiovascular: Normal rate, regular rhythm, normal heart sounds and intact distal  pulses.   Pulmonary/Chest: Effort normal and breath sounds normal.   Abdominal: Soft. Bowel sounds are normal.   Musculoskeletal: Normal range of motion.   Neurological: She is alert and oriented to person, place, and time.   Skin: Skin is warm and dry.   Psychiatric: She has a normal mood and affect. Her behavior is normal. Judgment and thought content normal.   Vitals reviewed.      Consults:   Consults (From admission, onward)        Status Ordering Provider     IP consult to case management  Once     Provider:  (Not yet assigned)    Acknowledged NATALIIA KENT          * Diabetic ketoacidosis without coma associated with type 2 diabetes mellitus    Patient with diabetes x 3 years on victoza, low dose SSI, and levemir nightly. Complaining of dysuria and 5 days of intractable nausea and vomiting. Patient holding insulin with prolonged vomiting and decreased PO intake   Anion Gap on Admission 17. 11/08 Anion Gap 8. Na on admission 129. Na now WNL, 133. K on admission 4.0. K now 3.6. . Patient still with nausea and intermittent vomiting. Patient on clear liquid diet.     Plan  - moderate dose SSI with meals and nightly  - 40 U levemir nightly   - 7 U aspart with meals  - NS IVF for continued dehydration   - Q4 CMP's   - Replace K PRN     Gastroenteritis    Patient presented to [facility] on day 5 of intractable N/V. Now complaining of loose stools x 2 days. Presumed viral gastroenteritis.     - IVF for dehydration   - Scheduled Zofran and PRN Phenergan for nausea   - improving        Type 2 diabetes mellitus, with long-term current use of insulin    Patient with diabetes x 3 yrs. Home meds of victoza 8.1 mg, novolog SSI, and detemir 40 U nightly. Patient on clear liquid diet.    - moderate dose SSI while on clear liquid diet  - will consider prandial insulin when diet is advanced  - will continue to hold home medications      Intractable vomiting with nausea    - Continue with Zofran 8 mg TID   - Continue with PRN  phenergan   -- resolved        UTI (urinary tract infection)    Patient with dysuria. UA leukocyte 3+ with moderate bacteria and negative nitrates.    - urine culture pending  - s/p ceftriaxone dose 3/3  - still complaining of itching , given 1 does fluconazole 150 mg.      Hyponatremia-resolved as of 11/9/2018    See DKA       Final Active Diagnoses:    Diagnosis Date Noted POA    PRINCIPAL PROBLEM:  Diabetic ketoacidosis without coma associated with type 2 diabetes mellitus [E11.10] 11/07/2018 Yes    Gastroenteritis [K52.9] 11/08/2018 Yes    UTI (urinary tract infection) [N39.0] 11/07/2018 Yes    Intractable vomiting with nausea [R11.2] 11/07/2018 Yes    Type 2 diabetes mellitus, with long-term current use of insulin [E11.9, Z79.4] 11/07/2018 Not Applicable      Problems Resolved During this Admission:    Diagnosis Date Noted Date Resolved POA    Hypokalemia [E87.6] 11/08/2018 11/09/2018 Yes    Hyponatremia [E87.1] 11/07/2018 11/09/2018 Unknown    Leukocytosis [D72.829] 11/07/2018 11/09/2018 Unknown       Discharged Condition: good    Disposition: Home or Self Care    Follow Up:  Follow-up Information     Please follow up.    Why:  ROBERTO CHRISTIAN NP-APPOINTMENT MADE ON November 14 AT 1015AM           Roberto Christian NP. Schedule an appointment as soon as possible for a visit in 1 week.    Specialty:  Internal Medicine  Contact information:  [address] [place] [address]  Piyush JAVED [address] [phone]                 Patient Instructions:   No discharge procedures on file.    Significant Diagnostic Studies: Labs:   CMP   Recent Labs   Lab 11/07/18  1347  11/08/18  0503  11/08/18 2005 11/08/18  2342 11/09/18  0424   *   < > 133*  133*   < > 135* 135* 138  138   K 4.0   < > 3.6  3.6   < > 3.3* 3.4* 4.2  4.2   CL 94*   < > 102  102   < > 107 107 112*  112*   CO2 18*   < > 22*  22*   < > 20* 22* 20*  20*   *   < > 181*  181*   < > 164* 214* 148*  148*   BUN 18   < > 11  11   < > 8 8 7  7    CREATININE 0.9   < > 0.7  0.7   < > 0.6 0.7 0.6  0.6   CALCIUM 9.5   < > 8.5*  8.5*   < > 8.4* 8.4* 8.2*  8.2*   PROT 7.9  --  6.7  --   --   --  5.3*   ALBUMIN 3.6  --  3.0*  --   --   --  2.3*   BILITOT 1.3*  --  1.1*  --   --   --  0.5   ALKPHOS 92  --  73  --   --   --  64   AST 14  --  10  --   --   --  10   ALT 17  --  12  --   --   --  10   ANIONGAP 17*   < > 9  9   < > 8 6* 6*  6*   ESTGFRAFRICA >60.0   < > >60.0  >60.0   < > >60.0 >60.0 >60.0  >60.0   EGFRNONAA >60.0   < > >60.0  >60.0   < > >60.0 >60.0 >60.0  >60.0    < > = values in this interval not displayed.    and CBC   Recent Labs   Lab 11/07/18  1347 11/08/18  0503 11/09/18  0424   WBC 14.38* 10.33 6.87   HGB 14.9 14.2 11.7*   HCT 42.2 39.7 34.3*    260 202       Pending Diagnostic Studies:     None         Medications:  Reconciled Home Medications:      Medication List      START taking these medications    cetirizine 10 MG tablet  Commonly known as:  ZYRTEC  Take 1 tablet (10 mg total) by mouth once daily.     omeprazole 20 MG capsule  Commonly known as:  PRILOSEC  Take 1 capsule (20 mg total) by mouth once daily.     ondansetron 8 MG Tbdl  Commonly known as:  ZOFRAN-ODT  Take 1 tablet (8 mg total) by mouth every 8 (eight) hours as needed (nausea/vomiting).        CONTINUE taking these medications    blood sugar diagnostic Strp  1 strip by Misc.(Non-Drug; Combo Route) route 3 (three) times daily.     citalopram 20 MG tablet  Commonly known as:  CELEXA  Take 20 mg by mouth once daily.     insulin aspart U-100 100 unit/mL injection  Commonly known as:  NOVOLOG  Inject 5 Units into the skin 3 (three) times daily before meals. Plus sliding scale     insulin detemir U-100 100 unit/mL injection  Commonly known as:  LEVEMIR  Inject 40 Units into the skin every evening.     lancets 30 gauge Misc  1 lancet by Misc.(Non-Drug; Combo Route) route 3 (three) times daily.     VICTOZA 2-ANAHI SUBQ  Inject 1.8 mg into the skin once daily.             Indwelling Lines/Drains at time of discharge:   Lines/Drains/Airways          None          Time spent on the discharge of patient: 30 minutes  Patient was seen and examined on the date of discharge and determined to be suitable for discharge.         BRITTANI Porter  Department of Hospital Medicine  [facility]

## 2018-11-09 NOTE — ASSESSMENT & PLAN NOTE
Patient with diabetes x 3 years on victoza, low dose SSI, and levemir nightly. Complaining of dysuria and 5 days of intractable nausea and vomiting. Patient holding insulin with prolonged vomiting and decreased PO intake   Anion Gap on Admission 17. 11/08 Anion Gap 8. Na on admission 129. Na now WNL, 133. K on admission 4.0. K now 3.6. . Patient still with nausea and intermittent vomiting. Patient on clear liquid diet.     Plan  - moderate dose SSI with meals and nightly  - 40 U levemir nightly   - 7 U aspart with meals  - NS IVF for continued dehydration   - Q4 CMP's   - Replace K PRN

## 2018-11-09 NOTE — PLAN OF CARE
Problem: Patient Care Overview  Goal: Plan of Care Review  Outcome: Ongoing (interventions implemented as appropriate)  No acute changes overnight. AOx4. VSS. BG stable. No complaints of pain. No complaints of N/V. NS infusing at 150 cc/hr. Pt resting. No needs at this time. Call bell in reach. Will continue to monitor.

## 2018-11-12 LAB
BACTERIA BLD CULT: NORMAL
BACTERIA BLD CULT: NORMAL

## 2019-03-07 ENCOUNTER — HOSPITAL ENCOUNTER (EMERGENCY)
Facility: HOSPITAL | Age: 34
Discharge: HOME | End: 2019-03-08
Attending: EMERGENCY MEDICINE
Payer: MEDICAID

## 2019-03-07 VITALS
TEMPERATURE: 98 F | RESPIRATION RATE: 18 BRPM | SYSTOLIC BLOOD PRESSURE: 118 MMHG | HEART RATE: 104 BPM | DIASTOLIC BLOOD PRESSURE: 67 MMHG | HEIGHT: 60 IN | WEIGHT: 207 LBS | OXYGEN SATURATION: 99 % | BODY MASS INDEX: 40.64 KG/M2

## 2019-03-07 DIAGNOSIS — J02.9 PHARYNGITIS, UNSPECIFIED ETIOLOGY: Primary | ICD-10-CM

## 2019-03-07 LAB — DEPRECATED S PYO AG THROAT QL EIA: NEGATIVE

## 2019-03-07 PROCEDURE — 99284 EMERGENCY DEPT VISIT MOD MDM: CPT | Mod: 25

## 2019-03-07 PROCEDURE — 87880 STREP A ASSAY W/OPTIC: CPT

## 2019-03-07 PROCEDURE — 96372 THER/PROPH/DIAG INJ SC/IM: CPT

## 2019-03-07 PROCEDURE — 99284 PR EMERGENCY DEPT VISIT,LEVEL IV: ICD-10-PCS | Mod: ,,, | Performed by: EMERGENCY MEDICINE

## 2019-03-07 PROCEDURE — 99284 EMERGENCY DEPT VISIT MOD MDM: CPT | Mod: ,,, | Performed by: EMERGENCY MEDICINE

## 2019-03-07 PROCEDURE — 87081 CULTURE SCREEN ONLY: CPT

## 2019-03-07 RX ORDER — DEXAMETHASONE SODIUM PHOSPHATE 4 MG/ML
8 INJECTION, SOLUTION INTRA-ARTICULAR; INTRALESIONAL; INTRAMUSCULAR; INTRAVENOUS; SOFT TISSUE
Status: DISCONTINUED | OUTPATIENT
Start: 2019-03-08 | End: 2019-03-08

## 2019-03-08 PROCEDURE — 63600175 PHARM REV CODE 636 W HCPCS: Performed by: EMERGENCY MEDICINE

## 2019-03-08 RX ORDER — DEXAMETHASONE SODIUM PHOSPHATE 4 MG/ML
8 INJECTION, SOLUTION INTRA-ARTICULAR; INTRALESIONAL; INTRAMUSCULAR; INTRAVENOUS; SOFT TISSUE
Status: COMPLETED | OUTPATIENT
Start: 2019-03-08 | End: 2019-03-08

## 2019-03-08 RX ADMIN — DEXAMETHASONE SODIUM PHOSPHATE 8 MG: 4 INJECTION, SOLUTION INTRA-ARTICULAR; INTRALESIONAL; INTRAMUSCULAR; INTRAVENOUS; SOFT TISSUE at 12:03

## 2019-03-08 NOTE — ED NOTES
Patient identifiers verified and correct.  LOC: The patient is awake, alert and aware of environment with an appropriate affect, the patient is oriented x 3 and speaking appropriately.   APPEARANCE: Patient appears comfortable and in no acute distress, patient is clean and well groomed.  SKIN: The skin is warm and dry, color consistent with ethnicity, patient has normal skin turgor and WDL.   MUSCULOSKELETAL: Patient moving all extremities spontaneously, no swelling noted.  RESPIRATORY: Airway is open and patent, respirations are spontaneous, patient has a normal effort and rate, no accessory muscle use noted. Pt c/o sore throat and pain with swallowing.  CARDIAC: Pt has normal R&R, cap refill <3 sec.  ABDOMEN: Pt denies any changes in BM. Abd WDL.  : Pt denies frequency or burning with urination.  NEURO: PERRL, opens eyes spontaneously, equal bilateral hand strength, follows commands, equal facial symmetry, normal sensation in all extremities when touched with a finger.              Pt identity confirmed; pt educated on course of action in and purpose of intake area; chair in lowest position and adjusted for maximum comfort; pt provided w/ remote and educated on its use, as well as to not vertically elevate chair without a member of the staff present; pt advised to call for assistance when needed; pt educated on reasons for holding PO intake until lab work and imaging have resulted; Pt informed on nearest restroom's location; pt verbalized understanding & agreed

## 2019-03-08 NOTE — ED PROVIDER NOTES
Encounter Date: 3/7/2019    SCRIBE #1 NOTE: I, Son Donna, am scribing for, and in the presence of,  Dr. Fox. I have scribed the entire note.       History     Chief Complaint   Patient presents with    Sore Throat     Patient reports sore throat for the past week.      Ms. Dawkins is a 33 y.o. female with no known past medical history presenting with a chief complaint of recurrent sore throat x1 week. Patient states that she had similar symptoms in the middle of last month; however, it has came back last week. She notes that she was given a course of antibiotics, which resolved her symptoms. She states her pain began on her right side, but now it is bilateral. She rates her pain as a 10/10 which worsens when she swallows. Reports of mild cough, nausea and vomitting. Denies any fever or chills. Patient is a current smoker.       The history is provided by the patient and medical records.     Review of patient's allergies indicates:   Allergen Reactions    Bactrim [sulfamethoxazole-trimethoprim] Rash     Past Medical History:   Diagnosis Date    Diabetes mellitus      History reviewed. No pertinent surgical history.  History reviewed. No pertinent family history.  Social History     Tobacco Use    Smoking status: Never Smoker    Smokeless tobacco: Never Used   Substance Use Topics    Alcohol use: No     Frequency: Never    Drug use: No     Review of Systems   Constitutional: Negative for chills and fever.   HENT: Positive for sore throat. Negative for drooling.    Eyes: Negative for visual disturbance.   Respiratory: Positive for cough.    Cardiovascular: Negative for chest pain.   Gastrointestinal: Positive for nausea and vomiting. Negative for diarrhea.   Genitourinary: Negative for dysuria.   Musculoskeletal: Negative for back pain.   Skin: Negative for rash.   Neurological: Negative for headaches.       Physical Exam     Initial Vitals [03/07/19 2208]   BP Pulse Resp Temp SpO2   118/67 104 18 98 °F  (36.7 °C) 99 %      MAP       --         Physical Exam    Nursing note and vitals reviewed.  Constitutional: She appears well-developed and well-nourished. She is not diaphoretic. No distress.   HENT:   Head: Normocephalic and atraumatic.   Mouth/Throat: Oropharynx is clear and moist.   Scaring bilateral TM   Redness bilaterally posterior pharyngeal  No posterior pharyngeal fullness or exudate   Neck: Normal range of motion. Neck supple. No JVD present.   Cardiovascular: Normal rate, regular rhythm, normal heart sounds and intact distal pulses.   Pulmonary/Chest: Breath sounds normal. No respiratory distress. She has no wheezes. She has no rhonchi. She has no rales.   Abdominal: Soft. She exhibits no distension. There is no tenderness.   Musculoskeletal: Normal range of motion. She exhibits no edema.   Lymphadenopathy:     She has cervical adenopathy (bilateral ).   No submental lymphadenopathy   Neurological: She is alert and oriented to person, place, and time. She has normal strength. No cranial nerve deficit or sensory deficit.   Skin: Skin is warm and dry.         ED Course   Procedures  Labs Reviewed   THROAT SCREEN, RAPID   CULTURE, STREP A,  THROAT          Imaging Results    None          Medical Decision Making:   History:   Old Medical Records: I decided to obtain old medical records.  Initial Assessment:   Urgent evaluation of a 34 y/o female here for sore throat. Exam with mild posterior erythema without exudate or posterior fullness. Uvula midline. She does have bilateral cervical lymphadenopathy.   Differential Diagnosis:   Viral pharyngitis , strep pharyngitis, less likely pharyngitis abscess  Clinical Tests:   Lab Tests: Ordered and Reviewed  ED Management:  - strep swab    12:00 AM  Strep swab negative. Will discharge with supportive care. Will treat patient with decadron. Patient stable for discharge            Scribe Attestation:   Scribe #1: I performed the above scribed service and the  documentation accurately describes the services I performed. I attest to the accuracy of the note.    Attending Attestation:           Physician Attestation for Scribe:      Comments: I, Dr. Stephanie Fox, personally performed the services described in this documentation. All medical record entries made by the scribe were at my direction and in my presence.  I have reviewed the chart and agree that the record reflects my personal performance and is accurate and complete. Stephanie Fox MD.                 Clinical Impression:       ICD-10-CM ICD-9-CM   1. Pharyngitis, unspecified etiology J02.9 462         Disposition:   Disposition: Discharged  Condition: Stable                        Stephanie Fox MD  03/08/19 [id number]

## 2019-03-08 NOTE — ED TRIAGE NOTES
Petrona Perdomo, a 33 y.o. female presents to the ED w/ complaint of sore throat and ear pain with swallowing x1 wk. Pt reports no home tx. Pt denies CP, n/v/d, fever/chills.    Triage note:  Chief Complaint   Patient presents with    Sore Throat     Patient reports sore throat for the past week.      Review of patient's allergies indicates:   Allergen Reactions    Bactrim [sulfamethoxazole-trimethoprim] Rash     Past Medical History:   Diagnosis Date    Diabetes mellitus

## 2019-03-10 LAB — BACTERIA THROAT CULT: NORMAL

## 2019-05-04 ENCOUNTER — HOSPITAL ENCOUNTER (EMERGENCY)
Facility: HOSPITAL | Age: 34
Discharge: HOME | End: 2019-05-04
Attending: EMERGENCY MEDICINE
Payer: MEDICAID

## 2019-05-04 VITALS
SYSTOLIC BLOOD PRESSURE: 124 MMHG | RESPIRATION RATE: 16 BRPM | TEMPERATURE: 98 F | OXYGEN SATURATION: 99 % | DIASTOLIC BLOOD PRESSURE: 91 MMHG | BODY MASS INDEX: 40.25 KG/M2 | HEIGHT: 60 IN | HEART RATE: 97 BPM | WEIGHT: 205 LBS

## 2019-05-04 DIAGNOSIS — B37.89 CANDIDA RASH OF GROIN: ICD-10-CM

## 2019-05-04 DIAGNOSIS — B37.31 VAGINAL CANDIDA: Primary | ICD-10-CM

## 2019-05-04 LAB
BACTERIA #/AREA URNS AUTO: ABNORMAL /HPF
BILIRUB UR QL STRIP: NEGATIVE
CLARITY UR REFRACT.AUTO: ABNORMAL
COLOR UR AUTO: YELLOW
GLUCOSE UR QL STRIP: ABNORMAL
HGB UR QL STRIP: ABNORMAL
HYALINE CASTS UR QL AUTO: 3 /LPF
KETONES UR QL STRIP: NEGATIVE
LEUKOCYTE ESTERASE UR QL STRIP: ABNORMAL
MICROSCOPIC COMMENT: ABNORMAL
NITRITE UR QL STRIP: NEGATIVE
PH UR STRIP: 5 [PH] (ref 5–8)
POCT GLUCOSE: 373 MG/DL (ref 70–110)
PROT UR QL STRIP: NEGATIVE
RBC #/AREA URNS AUTO: 5 /HPF (ref 0–4)
SP GR UR STRIP: >=1.03 (ref 1–1.03)
SQUAMOUS #/AREA URNS AUTO: 3 /HPF
URN SPEC COLLECT METH UR: ABNORMAL
WBC #/AREA URNS AUTO: 5 /HPF (ref 0–5)
YEAST UR QL AUTO: ABNORMAL

## 2019-05-04 PROCEDURE — 99284 PR EMERGENCY DEPT VISIT,LEVEL IV: ICD-10-PCS | Mod: ,,, | Performed by: EMERGENCY MEDICINE

## 2019-05-04 PROCEDURE — 81001 URINALYSIS AUTO W/SCOPE: CPT

## 2019-05-04 PROCEDURE — 99284 EMERGENCY DEPT VISIT MOD MDM: CPT | Mod: 25

## 2019-05-04 PROCEDURE — 82962 GLUCOSE BLOOD TEST: CPT

## 2019-05-04 PROCEDURE — 99284 EMERGENCY DEPT VISIT MOD MDM: CPT | Mod: ,,, | Performed by: EMERGENCY MEDICINE

## 2019-05-04 PROCEDURE — 25000003 PHARM REV CODE 250: Performed by: EMERGENCY MEDICINE

## 2019-05-04 RX ORDER — MICONAZOLE NITRATE 2 %
POWDER (GRAM) TOPICAL 2 TIMES DAILY
Status: DISCONTINUED | OUTPATIENT
Start: 2019-05-04 | End: 2019-05-04 | Stop reason: HOSPADM

## 2019-05-04 RX ORDER — MICONAZOLE NITRATE 100 MG/1
200 SUPPOSITORY VAGINAL ONCE
Status: COMPLETED | OUTPATIENT
Start: 2019-05-04 | End: 2019-05-04

## 2019-05-04 RX ORDER — MICONAZOLE NITRATE 100 MG/1
100 SUPPOSITORY VAGINAL NIGHTLY
Status: DISCONTINUED | OUTPATIENT
Start: 2019-05-04 | End: 2019-05-04

## 2019-05-04 RX ORDER — DOXYLAMINE SUCCINATE 25 MG
TABLET ORAL 2 TIMES DAILY
Qty: 30 G | Refills: 0 | Status: SHIPPED | OUTPATIENT
Start: 2019-05-04

## 2019-05-04 RX ORDER — FLUCONAZOLE 150 MG/1
TABLET ORAL
Qty: 3 TABLET | Refills: 0 | Status: SHIPPED | OUTPATIENT
Start: 2019-05-04 | End: 2021-06-12

## 2019-05-04 RX ADMIN — MICONAZOLE NITRATE: 20 POWDER TOPICAL at 03:05

## 2019-05-04 RX ADMIN — MICONAZOLE NITRATE 200 MG: 100 SUPPOSITORY VAGINAL at 03:05

## 2019-05-04 NOTE — ED PROVIDER NOTES
"Encounter Date: 5/4/2019       History     Chief Complaint   Patient presents with    Female  Problem     Pt reports to ED with c/o rash, "white bumps" between legs, labia, and buttocks that she says "itches and burns. I can't even wear underwear." Pt reports pain. Denies discharge/smell     34-year-old morbidly obese female presents to the emergency department with complaints of bilateral groin pain and vaginal burning that began 2-3 days prior to arrival.  The patient also reports discomfort wearing undergarments and wiping herself after urination.  On she denies nausea, vomiting, fever, chills, back pain, chest pain or shortness of breath. The pain that she experiences has a 10/10 intensity, exacerbated with palpation or friction from clothing, alleviated with lying still and burning in character.        Review of patient's allergies indicates:   Allergen Reactions    Bactrim [sulfamethoxazole-trimethoprim] Rash     Past Medical History:   Diagnosis Date    Diabetes mellitus      No past surgical history on file.  No family history on file.  Social History     Tobacco Use    Smoking status: Never Smoker    Smokeless tobacco: Never Used   Substance Use Topics    Alcohol use: No     Frequency: Never    Drug use: No     Review of Systems   Constitutional: Positive for activity change. Negative for appetite change.   HENT: Negative for facial swelling.    Eyes: Negative for pain.   Respiratory: Negative for shortness of breath.    Cardiovascular: Negative for chest pain.   Gastrointestinal: Negative for abdominal pain.   Genitourinary: Negative for dysuria.   Musculoskeletal: Negative for arthralgias and back pain.   Skin: Positive for color change and wound.   Neurological: Negative for dizziness and numbness.   Psychiatric/Behavioral: Negative for confusion.   All other systems reviewed and are negative.      Physical Exam     Initial Vitals [05/04/19 0116]   BP Pulse Resp Temp SpO2   (!) 124/91 97 16 98.4 " °F (36.9 °C) 99 %      MAP       --         Physical Exam    Nursing note and vitals reviewed.  Constitutional: Vital signs are normal. She appears well-developed and well-nourished.   HENT:   Head: Normocephalic and atraumatic.   Eyes: Conjunctivae, EOM and lids are normal. Pupils are equal, round, and reactive to light.   Neck: Normal range of motion. Neck supple.   Cardiovascular: Normal rate, regular rhythm, S1 normal, S2 normal, normal heart sounds, intact distal pulses and normal pulses.   Pulmonary/Chest: Breath sounds normal.   Abdominal: Soft. Normal appearance and bowel sounds are normal.   Genitourinary:   Genitourinary Comments: Severe vaginal candidiasis  Bilateral groin chafed skin   Musculoskeletal: Normal range of motion.   Neurological: She is alert and oriented to person, place, and time. No cranial nerve deficit or sensory deficit.   Skin: Skin is warm and dry. Capillary refill takes less than 2 seconds. There is erythema.   Psychiatric: She has a normal mood and affect. Her speech is normal and behavior is normal. Judgment normal.         ED Course   Procedures  Labs Reviewed   CBC W/ AUTO DIFFERENTIAL - Abnormal; Notable for the following components:       Result Value    Mean Corpuscular Hemoglobin 31.5 (*)     Immature Grans (Abs) 0.05 (*)     Lymph # 5.4 (*)     All other components within normal limits   POCT GLUCOSE - Abnormal; Notable for the following components:    POCT Glucose 373 (*)     All other components within normal limits   URINALYSIS, REFLEX TO URINE CULTURE   BASIC METABOLIC PANEL          Imaging Results    None                               Petrona Perdomo presents with clinical signs and symptoms that correlate but is not limited to vaginal candida,tinea cruris,vulvovaginitis,candida secondary to poor glycemic control,UTI    I performed a detailed H&P,ordered basic p.o. CT glucose and UA.  UA results were unremarkable and the patient did not require any antibiotics for  treatment of acute cystitis.  Glucose results were elevated but did not require treatment with IV insulin.  Based on my physical exam findings and the patient's medical history I provided her with topical miconazole and 1 vaginal suppository while in the ED.  The patient was informed of the most likely reasons why she was experiencing this episode of for vaginal candida and explained the importance of maintaining strict glycemic control and proper hygiene.  The patient stated that she understood would put for the bed after at maintaining strict medication compliant and proper hygiene.  Prior to discharge she was provided with a prescription for miconazole powder and Diflucan oral tablets.  Vitals remained stable throughout her entire visit and mental status remained at baseline.  Marlyn Yan M.D  Emergency Medicine PGY-4  4:00AM         Clinical Impression:       ICD-10-CM ICD-9-CM   1. Vaginal candida B37.3 112.1   2. Candida rash of groin B37.89 112.89                                Marlyn Yan MD  Resident  05/04/19 0433

## 2019-05-04 NOTE — ED TRIAGE NOTES
Patient reports painful/itchy/burning rash in bikini line and medial thigh starting today. Patient reports white pus. Patient was taking abx until 4/30 for abscess on labia. Area is red and inflammed with white papules. Denies fevers/chills/nvd.

## 2019-05-06 ENCOUNTER — HOSPITAL ENCOUNTER (INPATIENT)
Facility: HOSPITAL | Age: 34
LOS: 2 days | Discharge: HOME | DRG: 638 | End: 2019-05-08
Attending: EMERGENCY MEDICINE | Admitting: HOSPITALIST
Payer: MEDICAID

## 2019-05-06 DIAGNOSIS — R11.15 INTRACTABLE CYCLICAL VOMITING WITH NAUSEA: ICD-10-CM

## 2019-05-06 DIAGNOSIS — E11.10 DIABETIC KETOACIDOSIS WITHOUT COMA ASSOCIATED WITH TYPE 2 DIABETES MELLITUS: Primary | ICD-10-CM

## 2019-05-06 LAB
ALLENS TEST: ABNORMAL
B-HCG UR QL: NEGATIVE
B-OH-BUTYR BLD STRIP-SCNC: 5.1 MMOL/L (ref 0–0.5)
BACTERIA #/AREA URNS AUTO: ABNORMAL /HPF
BASOPHILS # BLD AUTO: 0.07 K/UL (ref 0–0.2)
BASOPHILS NFR BLD: 0.3 % (ref 0–1.9)
BILIRUB UR QL STRIP: NEGATIVE
BUN SERPL-MCNC: 20 MG/DL (ref 6–30)
CHLORIDE SERPL-SCNC: 95 MMOL/L (ref 95–110)
CLARITY UR REFRACT.AUTO: CLEAR
COLOR UR AUTO: ABNORMAL
CREAT SERPL-MCNC: 0.5 MG/DL (ref 0.5–1.4)
CTP QC/QA: YES
DELSYS: ABNORMAL
DEPRECATED S PYO AG THROAT QL EIA: NEGATIVE
DIFFERENTIAL METHOD: ABNORMAL
EOSINOPHIL # BLD AUTO: 0 K/UL (ref 0–0.5)
EOSINOPHIL NFR BLD: 0 % (ref 0–8)
ERYTHROCYTE [DISTWIDTH] IN BLOOD BY AUTOMATED COUNT: 12.1 % (ref 11.5–14.5)
FIO2: 21
GLUCOSE SERPL-MCNC: 348 MG/DL (ref 70–110)
GLUCOSE UR QL STRIP: ABNORMAL
HCO3 UR-SCNC: 20.1 MMOL/L (ref 24–28)
HCT VFR BLD AUTO: 43.3 % (ref 37–48.5)
HCT VFR BLD CALC: 41 %PCV (ref 36–54)
HGB BLD-MCNC: 15 G/DL (ref 12–16)
HGB UR QL STRIP: ABNORMAL
HYALINE CASTS UR QL AUTO: 0 /LPF
IMM GRANULOCYTES # BLD AUTO: 0.1 K/UL (ref 0–0.04)
IMM GRANULOCYTES NFR BLD AUTO: 0.5 % (ref 0–0.5)
INFLUENZA A, MOLECULAR: NEGATIVE
INFLUENZA B, MOLECULAR: NEGATIVE
KETONES UR QL STRIP: ABNORMAL
LEUKOCYTE ESTERASE UR QL STRIP: NEGATIVE
LYMPHOCYTES # BLD AUTO: 3 K/UL (ref 1–4.8)
LYMPHOCYTES NFR BLD: 13.6 % (ref 18–48)
MCH RBC QN AUTO: 31.3 PG (ref 27–31)
MCHC RBC AUTO-ENTMCNC: 34.6 G/DL (ref 32–36)
MCV RBC AUTO: 90 FL (ref 82–98)
MICROSCOPIC COMMENT: ABNORMAL
MODE: ABNORMAL
MONOCYTES # BLD AUTO: 1 K/UL (ref 0.3–1)
MONOCYTES NFR BLD: 4.4 % (ref 4–15)
NEUTROPHILS # BLD AUTO: 17.9 K/UL (ref 1.8–7.7)
NEUTROPHILS NFR BLD: 81.2 % (ref 38–73)
NITRITE UR QL STRIP: NEGATIVE
NRBC BLD-RTO: 0 /100 WBC
PCO2 BLDA: 33.1 MMHG (ref 35–45)
PH SMN: 7.39 [PH] (ref 7.35–7.45)
PH UR STRIP: 5 [PH] (ref 5–8)
PLATELET # BLD AUTO: 324 K/UL (ref 150–350)
PMV BLD AUTO: 11 FL (ref 9.2–12.9)
PO2 BLDA: 44 MMHG (ref 40–60)
POC BE: -5 MMOL/L
POC IONIZED CALCIUM: 1.03 MMOL/L (ref 1.06–1.42)
POC SATURATED O2: 80 % (ref 95–100)
POC TCO2 (MEASURED): 21 MMOL/L (ref 23–29)
POC TCO2: 21 MMOL/L (ref 24–29)
POCT GLUCOSE: 385 MG/DL (ref 70–110)
POTASSIUM BLD-SCNC: 3.3 MMOL/L (ref 3.5–5.1)
PROT UR QL STRIP: ABNORMAL
RBC # BLD AUTO: 4.8 M/UL (ref 4–5.4)
RBC #/AREA URNS AUTO: 9 /HPF (ref 0–4)
SAMPLE: ABNORMAL
SAMPLE: ABNORMAL
SITE: ABNORMAL
SODIUM BLD-SCNC: 132 MMOL/L (ref 136–145)
SP GR UR STRIP: 1.03 (ref 1–1.03)
SPECIMEN SOURCE: NORMAL
SQUAMOUS #/AREA URNS AUTO: 6 /HPF
URN SPEC COLLECT METH UR: ABNORMAL
WBC # BLD AUTO: 22.06 K/UL (ref 3.9–12.7)
WBC #/AREA URNS AUTO: 1 /HPF (ref 0–5)
YEAST UR QL AUTO: ABNORMAL

## 2019-05-06 PROCEDURE — 99291 PR CRITICAL CARE, E/M 30-74 MINUTES: ICD-10-PCS | Mod: ,,, | Performed by: PHYSICIAN ASSISTANT

## 2019-05-06 PROCEDURE — 25000003 PHARM REV CODE 250: Performed by: EMERGENCY MEDICINE

## 2019-05-06 PROCEDURE — 99291 CRITICAL CARE FIRST HOUR: CPT | Mod: ,,, | Performed by: PHYSICIAN ASSISTANT

## 2019-05-06 PROCEDURE — 87502 INFLUENZA DNA AMP PROBE: CPT

## 2019-05-06 PROCEDURE — 25000003 PHARM REV CODE 250: Performed by: PHYSICIAN ASSISTANT

## 2019-05-06 PROCEDURE — 96361 HYDRATE IV INFUSION ADD-ON: CPT

## 2019-05-06 PROCEDURE — 81001 URINALYSIS AUTO W/SCOPE: CPT

## 2019-05-06 PROCEDURE — 99900035 HC TECH TIME PER 15 MIN (STAT)

## 2019-05-06 PROCEDURE — 87081 CULTURE SCREEN ONLY: CPT

## 2019-05-06 PROCEDURE — 82803 BLOOD GASES ANY COMBINATION: CPT

## 2019-05-06 PROCEDURE — 82962 GLUCOSE BLOOD TEST: CPT

## 2019-05-06 PROCEDURE — 12000002 HC ACUTE/MED SURGE SEMI-PRIVATE ROOM

## 2019-05-06 PROCEDURE — 87880 STREP A ASSAY W/OPTIC: CPT

## 2019-05-06 PROCEDURE — 82010 KETONE BODYS QUAN: CPT

## 2019-05-06 PROCEDURE — 85025 COMPLETE CBC W/AUTO DIFF WBC: CPT

## 2019-05-06 PROCEDURE — 80053 COMPREHEN METABOLIC PANEL: CPT

## 2019-05-06 PROCEDURE — 99291 CRITICAL CARE FIRST HOUR: CPT | Mod: 25

## 2019-05-06 RX ORDER — ONDANSETRON 8 MG/1
8 TABLET, ORALLY DISINTEGRATING ORAL
Status: COMPLETED | OUTPATIENT
Start: 2019-05-06 | End: 2019-05-06

## 2019-05-06 RX ADMIN — SODIUM CHLORIDE 1000 ML: 0.9 INJECTION, SOLUTION INTRAVENOUS at 10:05

## 2019-05-06 RX ADMIN — SODIUM CHLORIDE 1000 ML: 0.9 INJECTION, SOLUTION INTRAVENOUS at 08:05

## 2019-05-06 RX ADMIN — ONDANSETRON 8 MG: 8 TABLET, ORALLY DISINTEGRATING ORAL at 08:05

## 2019-05-07 PROBLEM — E86.9 VOLUME DEPLETION, GASTROINTESTINAL LOSS: Status: ACTIVE | Noted: 2019-05-07

## 2019-05-07 PROBLEM — B37.31 VAGINAL YEAST INFECTION: Status: ACTIVE | Noted: 2019-05-07

## 2019-05-07 PROBLEM — D72.829 LEUKOCYTOSIS: Status: ACTIVE | Noted: 2019-05-07

## 2019-05-07 PROBLEM — E87.6 HYPOKALEMIA: Status: ACTIVE | Noted: 2019-05-07

## 2019-05-07 LAB
ALBUMIN SERPL BCP-MCNC: 3.2 G/DL (ref 3.5–5.2)
ALP SERPL-CCNC: 86 U/L (ref 55–135)
ALT SERPL W/O P-5'-P-CCNC: 12 U/L (ref 10–44)
ANION GAP SERPL CALC-SCNC: 10 MMOL/L (ref 8–16)
ANION GAP SERPL CALC-SCNC: 20 MMOL/L (ref 8–16)
ANION GAP SERPL CALC-SCNC: 20 MMOL/L (ref 8–16)
AST SERPL-CCNC: 11 U/L (ref 10–40)
B-OH-BUTYR BLD STRIP-SCNC: 0.8 MMOL/L (ref 0–0.5)
BASOPHILS # BLD AUTO: 0.05 K/UL (ref 0–0.2)
BASOPHILS # BLD AUTO: 0.06 K/UL (ref 0–0.2)
BASOPHILS NFR BLD: 0.4 % (ref 0–1.9)
BASOPHILS NFR BLD: 0.4 % (ref 0–1.9)
BILIRUB SERPL-MCNC: 1 MG/DL (ref 0.1–1)
BUN SERPL-MCNC: 12 MG/DL (ref 6–20)
BUN SERPL-MCNC: 16 MG/DL (ref 6–20)
BUN SERPL-MCNC: 19 MG/DL (ref 6–20)
CALCIUM SERPL-MCNC: 8.7 MG/DL (ref 8.7–10.5)
CALCIUM SERPL-MCNC: 8.7 MG/DL (ref 8.7–10.5)
CALCIUM SERPL-MCNC: 8.9 MG/DL (ref 8.7–10.5)
CHLORIDE SERPL-SCNC: 101 MMOL/L (ref 95–110)
CHLORIDE SERPL-SCNC: 95 MMOL/L (ref 95–110)
CHLORIDE SERPL-SCNC: 98 MMOL/L (ref 95–110)
CO2 SERPL-SCNC: 17 MMOL/L (ref 23–29)
CO2 SERPL-SCNC: 18 MMOL/L (ref 23–29)
CO2 SERPL-SCNC: 24 MMOL/L (ref 23–29)
CREAT SERPL-MCNC: 0.8 MG/DL (ref 0.5–1.4)
CREAT SERPL-MCNC: 0.9 MG/DL (ref 0.5–1.4)
CREAT SERPL-MCNC: 0.9 MG/DL (ref 0.5–1.4)
DIFFERENTIAL METHOD: ABNORMAL
DIFFERENTIAL METHOD: ABNORMAL
EOSINOPHIL # BLD AUTO: 0 K/UL (ref 0–0.5)
EOSINOPHIL # BLD AUTO: 0.1 K/UL (ref 0–0.5)
EOSINOPHIL NFR BLD: 0.3 % (ref 0–8)
EOSINOPHIL NFR BLD: 0.3 % (ref 0–8)
ERYTHROCYTE [DISTWIDTH] IN BLOOD BY AUTOMATED COUNT: 11.5 % (ref 11.5–14.5)
ERYTHROCYTE [DISTWIDTH] IN BLOOD BY AUTOMATED COUNT: 11.6 % (ref 11.5–14.5)
EST. GFR  (AFRICAN AMERICAN): >60 ML/MIN/1.73 M^2
EST. GFR  (NON AFRICAN AMERICAN): >60 ML/MIN/1.73 M^2
ESTIMATED AVG GLUCOSE: 263 MG/DL (ref 68–131)
GLUCOSE SERPL-MCNC: 178 MG/DL (ref 70–110)
GLUCOSE SERPL-MCNC: 275 MG/DL (ref 70–110)
GLUCOSE SERPL-MCNC: 340 MG/DL (ref 70–110)
HBA1C MFR BLD HPLC: 10.8 % (ref 4–5.6)
HCT VFR BLD AUTO: 36.8 % (ref 37–48.5)
HCT VFR BLD AUTO: 41.2 % (ref 37–48.5)
HGB BLD-MCNC: 12.9 G/DL (ref 12–16)
HGB BLD-MCNC: 14.2 G/DL (ref 12–16)
IMM GRANULOCYTES # BLD AUTO: 0.04 K/UL (ref 0–0.04)
IMM GRANULOCYTES # BLD AUTO: 0.09 K/UL (ref 0–0.04)
IMM GRANULOCYTES NFR BLD AUTO: 0.3 % (ref 0–0.5)
IMM GRANULOCYTES NFR BLD AUTO: 0.5 % (ref 0–0.5)
LYMPHOCYTES # BLD AUTO: 3.2 K/UL (ref 1–4.8)
LYMPHOCYTES # BLD AUTO: 3.6 K/UL (ref 1–4.8)
LYMPHOCYTES NFR BLD: 21 % (ref 18–48)
LYMPHOCYTES NFR BLD: 23.4 % (ref 18–48)
MAGNESIUM SERPL-MCNC: 1.9 MG/DL (ref 1.6–2.6)
MCH RBC QN AUTO: 30.7 PG (ref 27–31)
MCH RBC QN AUTO: 30.9 PG (ref 27–31)
MCHC RBC AUTO-ENTMCNC: 34.5 G/DL (ref 32–36)
MCHC RBC AUTO-ENTMCNC: 35.1 G/DL (ref 32–36)
MCV RBC AUTO: 88 FL (ref 82–98)
MCV RBC AUTO: 89 FL (ref 82–98)
MONOCYTES # BLD AUTO: 0.9 K/UL (ref 0.3–1)
MONOCYTES # BLD AUTO: 1.4 K/UL (ref 0.3–1)
MONOCYTES NFR BLD: 6.5 % (ref 4–15)
MONOCYTES NFR BLD: 8.1 % (ref 4–15)
NEUTROPHILS # BLD AUTO: 11.9 K/UL (ref 1.8–7.7)
NEUTROPHILS # BLD AUTO: 9.4 K/UL (ref 1.8–7.7)
NEUTROPHILS NFR BLD: 69.1 % (ref 38–73)
NEUTROPHILS NFR BLD: 69.7 % (ref 38–73)
NRBC BLD-RTO: 0 /100 WBC
NRBC BLD-RTO: 0 /100 WBC
PHOSPHATE SERPL-MCNC: 2.7 MG/DL (ref 2.7–4.5)
PLATELET # BLD AUTO: 254 K/UL (ref 150–350)
PLATELET # BLD AUTO: 282 K/UL (ref 150–350)
PMV BLD AUTO: 10.2 FL (ref 9.2–12.9)
PMV BLD AUTO: 10.6 FL (ref 9.2–12.9)
POCT GLUCOSE: 125 MG/DL (ref 70–110)
POCT GLUCOSE: 134 MG/DL (ref 70–110)
POCT GLUCOSE: 135 MG/DL (ref 70–110)
POCT GLUCOSE: 143 MG/DL (ref 70–110)
POCT GLUCOSE: 144 MG/DL (ref 70–110)
POCT GLUCOSE: 165 MG/DL (ref 70–110)
POCT GLUCOSE: 173 MG/DL (ref 70–110)
POCT GLUCOSE: 173 MG/DL (ref 70–110)
POCT GLUCOSE: 178 MG/DL (ref 70–110)
POCT GLUCOSE: 187 MG/DL (ref 70–110)
POCT GLUCOSE: 195 MG/DL (ref 70–110)
POCT GLUCOSE: 195 MG/DL (ref 70–110)
POCT GLUCOSE: 198 MG/DL (ref 70–110)
POCT GLUCOSE: 209 MG/DL (ref 70–110)
POCT GLUCOSE: 214 MG/DL (ref 70–110)
POCT GLUCOSE: 216 MG/DL (ref 70–110)
POCT GLUCOSE: 236 MG/DL (ref 70–110)
POCT GLUCOSE: 243 MG/DL (ref 70–110)
POCT GLUCOSE: 262 MG/DL (ref 70–110)
POCT GLUCOSE: 285 MG/DL (ref 70–110)
POCT GLUCOSE: 292 MG/DL (ref 70–110)
POTASSIUM SERPL-SCNC: 3 MMOL/L (ref 3.5–5.1)
POTASSIUM SERPL-SCNC: 3.4 MMOL/L (ref 3.5–5.1)
POTASSIUM SERPL-SCNC: 3.5 MMOL/L (ref 3.5–5.1)
POTASSIUM SERPL-SCNC: 3.5 MMOL/L (ref 3.5–5.1)
PROT SERPL-MCNC: 7 G/DL (ref 6–8.4)
RBC # BLD AUTO: 4.18 M/UL (ref 4–5.4)
RBC # BLD AUTO: 4.63 M/UL (ref 4–5.4)
SODIUM SERPL-SCNC: 133 MMOL/L (ref 136–145)
SODIUM SERPL-SCNC: 135 MMOL/L (ref 136–145)
SODIUM SERPL-SCNC: 135 MMOL/L (ref 136–145)
WBC # BLD AUTO: 13.55 K/UL (ref 3.9–12.7)
WBC # BLD AUTO: 16.99 K/UL (ref 3.9–12.7)

## 2019-05-07 PROCEDURE — 80048 BASIC METABOLIC PNL TOTAL CA: CPT | Mod: 91

## 2019-05-07 PROCEDURE — 96365 THER/PROPH/DIAG IV INF INIT: CPT

## 2019-05-07 PROCEDURE — 63600175 PHARM REV CODE 636 W HCPCS: Performed by: PHYSICIAN ASSISTANT

## 2019-05-07 PROCEDURE — 20600001 HC STEP DOWN PRIVATE ROOM

## 2019-05-07 PROCEDURE — 96376 TX/PRO/DX INJ SAME DRUG ADON: CPT

## 2019-05-07 PROCEDURE — 96375 TX/PRO/DX INJ NEW DRUG ADDON: CPT

## 2019-05-07 PROCEDURE — 99223 PR INITIAL HOSPITAL CARE,LEVL III: ICD-10-PCS | Mod: ,,, | Performed by: HOSPITALIST

## 2019-05-07 PROCEDURE — 83036 HEMOGLOBIN GLYCOSYLATED A1C: CPT

## 2019-05-07 PROCEDURE — 63600175 PHARM REV CODE 636 W HCPCS: Performed by: HOSPITALIST

## 2019-05-07 PROCEDURE — 25000003 PHARM REV CODE 250: Performed by: HOSPITALIST

## 2019-05-07 PROCEDURE — 84100 ASSAY OF PHOSPHORUS: CPT

## 2019-05-07 PROCEDURE — 83735 ASSAY OF MAGNESIUM: CPT

## 2019-05-07 PROCEDURE — 85025 COMPLETE CBC W/AUTO DIFF WBC: CPT

## 2019-05-07 PROCEDURE — 80048 BASIC METABOLIC PNL TOTAL CA: CPT

## 2019-05-07 PROCEDURE — 82010 KETONE BODYS QUAN: CPT

## 2019-05-07 PROCEDURE — 96374 THER/PROPH/DIAG INJ IV PUSH: CPT | Mod: 59

## 2019-05-07 PROCEDURE — 96366 THER/PROPH/DIAG IV INF ADDON: CPT

## 2019-05-07 PROCEDURE — 96361 HYDRATE IV INFUSION ADD-ON: CPT

## 2019-05-07 PROCEDURE — 99223 1ST HOSP IP/OBS HIGH 75: CPT | Mod: ,,, | Performed by: HOSPITALIST

## 2019-05-07 PROCEDURE — 82962 GLUCOSE BLOOD TEST: CPT

## 2019-05-07 RX ORDER — ACETAMINOPHEN 325 MG/1
650 TABLET ORAL EVERY 6 HOURS PRN
Status: DISCONTINUED | OUTPATIENT
Start: 2019-05-07 | End: 2019-05-08 | Stop reason: HOSPADM

## 2019-05-07 RX ORDER — SODIUM CHLORIDE 0.9 % (FLUSH) 0.9 %
10 SYRINGE (ML) INJECTION
Status: DISCONTINUED | OUTPATIENT
Start: 2019-05-07 | End: 2019-05-08 | Stop reason: HOSPADM

## 2019-05-07 RX ORDER — ONDANSETRON 2 MG/ML
8 INJECTION INTRAMUSCULAR; INTRAVENOUS EVERY 6 HOURS PRN
Status: DISCONTINUED | OUTPATIENT
Start: 2019-05-07 | End: 2019-05-08 | Stop reason: HOSPADM

## 2019-05-07 RX ORDER — ENOXAPARIN SODIUM 100 MG/ML
40 INJECTION SUBCUTANEOUS EVERY 24 HOURS
Status: DISCONTINUED | OUTPATIENT
Start: 2019-05-07 | End: 2019-05-08 | Stop reason: HOSPADM

## 2019-05-07 RX ORDER — FLUCONAZOLE 150 MG/1
150 TABLET ORAL ONCE
Status: DISCONTINUED | OUTPATIENT
Start: 2019-05-07 | End: 2019-05-08 | Stop reason: HOSPADM

## 2019-05-07 RX ORDER — ONDANSETRON 2 MG/ML
4 INJECTION INTRAMUSCULAR; INTRAVENOUS EVERY 8 HOURS PRN
Status: DISCONTINUED | OUTPATIENT
Start: 2019-05-07 | End: 2019-05-07

## 2019-05-07 RX ORDER — CITALOPRAM 10 MG/1
20 TABLET ORAL DAILY
Status: DISCONTINUED | OUTPATIENT
Start: 2019-05-07 | End: 2019-05-08 | Stop reason: HOSPADM

## 2019-05-07 RX ORDER — POTASSIUM CHLORIDE 20 MEQ/15ML
40 SOLUTION ORAL ONCE
Status: DISCONTINUED | OUTPATIENT
Start: 2019-05-07 | End: 2019-05-08

## 2019-05-07 RX ORDER — GLUCAGON 1 MG
1 KIT INJECTION
Status: DISCONTINUED | OUTPATIENT
Start: 2019-05-07 | End: 2019-05-08 | Stop reason: HOSPADM

## 2019-05-07 RX ORDER — ONDANSETRON 2 MG/ML
8 INJECTION INTRAMUSCULAR; INTRAVENOUS EVERY 8 HOURS PRN
Status: DISCONTINUED | OUTPATIENT
Start: 2019-05-07 | End: 2019-05-07

## 2019-05-07 RX ORDER — POTASSIUM CHLORIDE 7.45 MG/ML
20 INJECTION INTRAVENOUS
Status: DISCONTINUED | OUTPATIENT
Start: 2019-05-07 | End: 2019-05-07

## 2019-05-07 RX ORDER — POTASSIUM CHLORIDE 14.9 MG/ML
10 INJECTION INTRAVENOUS
Status: DISCONTINUED | OUTPATIENT
Start: 2019-05-07 | End: 2019-05-07

## 2019-05-07 RX ORDER — MICONAZOLE NITRATE 100 MG/1
100 SUPPOSITORY VAGINAL NIGHTLY
Status: DISCONTINUED | OUTPATIENT
Start: 2019-05-07 | End: 2019-05-08 | Stop reason: HOSPADM

## 2019-05-07 RX ORDER — DEXTROSE MONOHYDRATE 100 MG/ML
1000 INJECTION, SOLUTION INTRAVENOUS
Status: DISCONTINUED | OUTPATIENT
Start: 2019-05-07 | End: 2019-05-08 | Stop reason: HOSPADM

## 2019-05-07 RX ORDER — IBUPROFEN 200 MG
24 TABLET ORAL
Status: DISCONTINUED | OUTPATIENT
Start: 2019-05-07 | End: 2019-05-08 | Stop reason: HOSPADM

## 2019-05-07 RX ORDER — IBUPROFEN 200 MG
16 TABLET ORAL
Status: DISCONTINUED | OUTPATIENT
Start: 2019-05-07 | End: 2019-05-08 | Stop reason: HOSPADM

## 2019-05-07 RX ORDER — POTASSIUM CHLORIDE 7.45 MG/ML
10 INJECTION INTRAVENOUS
Status: COMPLETED | OUTPATIENT
Start: 2019-05-07 | End: 2019-05-07

## 2019-05-07 RX ORDER — PANTOPRAZOLE SODIUM 40 MG/1
40 TABLET, DELAYED RELEASE ORAL DAILY
Status: DISCONTINUED | OUTPATIENT
Start: 2019-05-07 | End: 2019-05-08 | Stop reason: HOSPADM

## 2019-05-07 RX ADMIN — CITALOPRAM HYDROBROMIDE 20 MG: 10 TABLET ORAL at 09:05

## 2019-05-07 RX ADMIN — ONDANSETRON 4 MG: 2 INJECTION INTRAMUSCULAR; INTRAVENOUS at 03:05

## 2019-05-07 RX ADMIN — SODIUM CHLORIDE 1000 ML: 0.9 INJECTION, SOLUTION INTRAVENOUS at 02:05

## 2019-05-07 RX ADMIN — ONDANSETRON 4 MG: 2 INJECTION INTRAMUSCULAR; INTRAVENOUS at 02:05

## 2019-05-07 RX ADMIN — Medication 10 ML: at 10:05

## 2019-05-07 RX ADMIN — ENOXAPARIN SODIUM 40 MG: 100 INJECTION SUBCUTANEOUS at 05:05

## 2019-05-07 RX ADMIN — SODIUM CHLORIDE 5 UNITS/HR: 9 INJECTION, SOLUTION INTRAVENOUS at 02:05

## 2019-05-07 RX ADMIN — SODIUM CHLORIDE 1.5 UNITS/HR: 9 INJECTION, SOLUTION INTRAVENOUS at 10:05

## 2019-05-07 RX ADMIN — POTASSIUM CHLORIDE 10 MEQ: 10 INJECTION, SOLUTION INTRAVENOUS at 02:05

## 2019-05-07 RX ADMIN — POTASSIUM CHLORIDE 10 MEQ: 10 INJECTION, SOLUTION INTRAVENOUS at 04:05

## 2019-05-07 RX ADMIN — PANTOPRAZOLE SODIUM 40 MG: 40 TABLET, DELAYED RELEASE ORAL at 09:05

## 2019-05-07 RX ADMIN — SODIUM CHLORIDE 1.7 UNITS/HR: 9 INJECTION, SOLUTION INTRAVENOUS at 07:05

## 2019-05-07 NOTE — PLAN OF CARE
Patient informed of results per Dr Hernandez's message.   SW following for dc needs.Post acute needs to be determined.               Fany Cruz LMSW  [facility]   [id number]

## 2019-05-07 NOTE — PROGRESS NOTES
[facility] Hospital Medicine  Progress Note    Patient Name: Petrona Perdomo  MRN: [id number]  Patient Class: IP- Inpatient   Admission Date: 5/6/2019  Length of Stay: 0 days  Attending Physician: Indira Pena MD  Primary Care Provider: Roberto Christian NP    [facility] Medicine Team: [facility] K Indira Pena MD    Subjective:     Principal Problem:Diabetic ketoacidosis without coma associated with type 2 diabetes mellitus    HPI:  Patient is a 34 year old female with PMH of uncontrolled IDDM2 (A1C ~11), DKA  presented to ED with intractable nausea, NBNB emesis over last 3 days. Reports associated fatigue, generalized weakness, inability to tolerate PO intake, heart burn and polyuria. She was seen in ED 2 days ago with similar symptoms and sent home on diflucan for vaginal candida. Reports positive sick contact with her children recently having flu like symptoms. Denies fever, chills, abdominal pain, diarrhea, cough, SOB, chest pain, dysuria, hematuria, back pain, tobacco or alcohol use.      Patient is found to have DKA with glucose 340 and positive AGMA. Beta hydroxybutyrate (BHB) elevated to 5. She received IVF and insulin infusion at 5 units/hr in ED.   Recent .        Hospital Course:  5/7 - patient admits to not taking her insulin on time because she forgot.  We discussed strategies to help remember.  She has been educated on diet and reports she knows what to eat./ not eat.  She has a pcp on the [facility].   This am her symptoms are mostly resolved.  Well repeat lab this afternoon.      Interval History comfortable without pain, still in ED    Review of Systems   Constitutional: Negative for activity change, fatigue and fever.   HENT: Negative for congestion, trouble swallowing and voice change.    Eyes: Negative for visual disturbance.   Respiratory: Negative for cough, choking, chest tightness and shortness of breath.    Cardiovascular: Negative for chest pain.    Gastrointestinal: Negative for abdominal distention, abdominal pain, constipation, diarrhea, nausea and vomiting.   Genitourinary: Positive for vaginal discharge. Negative for difficulty urinating, dyspareunia and dysuria.   Musculoskeletal: Negative for arthralgias, back pain and gait problem.   Skin: Negative for rash.   Neurological: Negative for dizziness, facial asymmetry, light-headedness and headaches.   Psychiatric/Behavioral: Negative for agitation, behavioral problems, confusion and decreased concentration.     Objective:     Vital Signs (Most Recent):  Temp: 98.3 °F (36.8 °C) (05/07/19 0149)  Pulse: 73 (05/07/19 1402)  Resp: (!) 25 (05/07/19 1402)  BP: (!) 168/80 (05/07/19 1402)  SpO2: 99 % (05/07/19 1402) Vital Signs (24h Range):  Temp:  [98.3 °F (36.8 °C)] 98.3 °F (36.8 °C)  Pulse:  [] 73  Resp:  [16-25] 25  SpO2:  [89 %-100 %] 99 %  BP: (116-187)/(58-95) 168/80     Weight: 93 kg (205 lb)  Body mass index is 40.04 kg/m².    Intake/Output Summary (Last 24 hours) at 5/7/2019 1514  Last data filed at 5/7/2019 0541  Gross per 24 hour   Intake 2200 ml   Output --   Net 2200 ml      Physical Exam   Constitutional: She is oriented to person, place, and time. She appears well-developed and well-nourished.   obese   HENT:   Head: Normocephalic and atraumatic.   Mouth/Throat: Oropharynx is clear and moist.   Eyes: Pupils are equal, round, and reactive to light. EOM are normal. No scleral icterus.   Neck: Normal range of motion. Neck supple.   Cardiovascular: Normal rate, regular rhythm, normal heart sounds and intact distal pulses. Exam reveals no friction rub.   No murmur heard.  Pulmonary/Chest: Effort normal and breath sounds normal. No stridor. No respiratory distress. She has no wheezes. She has no rales.   Abdominal: Soft. Bowel sounds are normal. She exhibits no distension and no mass. There is no tenderness. There is no rebound and no guarding.   Musculoskeletal: Normal range of motion. She  exhibits no edema or deformity.   Lymphadenopathy:     She has no cervical adenopathy.   Neurological: She is alert and oriented to person, place, and time.   Skin: Skin is warm and dry.   Psychiatric: She has a normal mood and affect. Her behavior is normal. Thought content normal.       Significant Labs:   CBC:   Recent Labs   Lab 05/06/19 2031 05/06/19  2230 05/07/19  0701   WBC 22.06*  --  16.99*   HGB 15.0  --  12.9   HCT 43.3 41 36.8*     --  254     CMP:   Recent Labs   Lab 05/06/19  2216 05/07/19  0210   * 135*   K 3.4* 3.5  3.5   CL 95 98   CO2 18* 17*   * 275*   BUN 19 16   CREATININE 0.9 0.9   CALCIUM 8.7 8.9   PROT 7.0  --    ALBUMIN 3.2*  --    BILITOT 1.0  --    ALKPHOS 86  --    AST 11  --    ALT 12  --    ANIONGAP 20* 20*   EGFRNONAA >60.0 >60.0     cxr not done    Assessment/Plan:      * Diabetic ketoacidosis without coma associated with type 2 diabetes mellitus  Likely due to recent viral illness   -No evidence of overt infection   -continue insulin infusion and IVF per DKA protocol   -BMP, mg, phos Q4 hrs and NPO while on insulin gtt   -start on basal, prandial sq insulin and diet after resolution of DKA    5/7 - symptomatically improved. may dc drip after lab this evening, and resume insulin.       Type 2 diabetes mellitus, with long-term current use of insulin  Recent Labs     05/07/19  0742 05/07/19  0919 05/07/19  1047 05/07/19  1158 05/07/19  1255 05/07/19  1403   POCTGLUCOSE 195* 173* 198* 243* 209* 214*     5/7 - On insulin drip presently  .  Home regimen is detimer hs 40, aspart 5/5/5, victosia 1.8 mg daily injection.     Intractable vomiting with nausea  5/7 - resolved      Volume depletion, gastrointestinal loss  5/7 - IVFs      Hypokalemia  5/7 - k 3.5 - resolved      Leukocytosis  Suspect demargination  Wbc 22-> 16.9      Vaginal yeast infection  monostat vag suppository      VTE Risk Mitigation (From admission, onward)        Ordered     enoxaparin injection 40  mg  Daily      05/07/19 0153     IP VTE HIGH RISK PATIENT  Once      05/07/19 0314     Place OSMAN hose  Until discontinued      05/07/19 0314     Place sequential compression device  Until discontinued      05/07/19 0314              Indira Pena MD  Department of Hospital Medicine   [facility]

## 2019-05-07 NOTE — SUBJECTIVE & OBJECTIVE
Interval History comfortable without pain, still in ED    Review of Systems   Constitutional: Negative for activity change, fatigue and fever.   HENT: Negative for congestion, trouble swallowing and voice change.    Eyes: Negative for visual disturbance.   Respiratory: Negative for cough, choking, chest tightness and shortness of breath.    Cardiovascular: Negative for chest pain.   Gastrointestinal: Negative for abdominal distention, abdominal pain, constipation, diarrhea, nausea and vomiting.   Genitourinary: Positive for vaginal discharge. Negative for difficulty urinating, dyspareunia and dysuria.   Musculoskeletal: Negative for arthralgias, back pain and gait problem.   Skin: Negative for rash.   Neurological: Negative for dizziness, facial asymmetry, light-headedness and headaches.   Psychiatric/Behavioral: Negative for agitation, behavioral problems, confusion and decreased concentration.     Objective:     Vital Signs (Most Recent):  Temp: 98.3 °F (36.8 °C) (05/07/19 0149)  Pulse: 73 (05/07/19 1402)  Resp: (!) 25 (05/07/19 1402)  BP: (!) 168/80 (05/07/19 1402)  SpO2: 99 % (05/07/19 1402) Vital Signs (24h Range):  Temp:  [98.3 °F (36.8 °C)] 98.3 °F (36.8 °C)  Pulse:  [] 73  Resp:  [16-25] 25  SpO2:  [89 %-100 %] 99 %  BP: (116-187)/(58-95) 168/80     Weight: 93 kg (205 lb)  Body mass index is 40.04 kg/m².    Intake/Output Summary (Last 24 hours) at 5/7/2019 1514  Last data filed at 5/7/2019 0541  Gross per 24 hour   Intake 2200 ml   Output --   Net 2200 ml      Physical Exam   Constitutional: She is oriented to person, place, and time. She appears well-developed and well-nourished.   obese   HENT:   Head: Normocephalic and atraumatic.   Mouth/Throat: Oropharynx is clear and moist.   Eyes: Pupils are equal, round, and reactive to light. EOM are normal. No scleral icterus.   Neck: Normal range of motion. Neck supple.   Cardiovascular: Normal rate, regular rhythm, normal heart sounds and intact distal  pulses. Exam reveals no friction rub.   No murmur heard.  Pulmonary/Chest: Effort normal and breath sounds normal. No stridor. No respiratory distress. She has no wheezes. She has no rales.   Abdominal: Soft. Bowel sounds are normal. She exhibits no distension and no mass. There is no tenderness. There is no rebound and no guarding.   Musculoskeletal: Normal range of motion. She exhibits no edema or deformity.   Lymphadenopathy:     She has no cervical adenopathy.   Neurological: She is alert and oriented to person, place, and time.   Skin: Skin is warm and dry.   Psychiatric: She has a normal mood and affect. Her behavior is normal. Thought content normal.       Significant Labs:   CBC:   Recent Labs   Lab 05/06/19  2031 05/06/19  2230 05/07/19  0701   WBC 22.06*  --  16.99*   HGB 15.0  --  12.9   HCT 43.3 41 36.8*     --  254     CMP:   Recent Labs   Lab 05/06/19  2216 05/07/19  0210   * 135*   K 3.4* 3.5  3.5   CL 95 98   CO2 18* 17*   * 275*   BUN 19 16   CREATININE 0.9 0.9   CALCIUM 8.7 8.9   PROT 7.0  --    ALBUMIN 3.2*  --    BILITOT 1.0  --    ALKPHOS 86  --    AST 11  --    ALT 12  --    ANIONGAP 20* 20*   EGFRNONAA >60.0 >60.0     cxr not done

## 2019-05-07 NOTE — NURSING
Pt arrived to room 8084 from ED via stretcher. Pt oriented to room. VSS. Insulin infusion continued.

## 2019-05-07 NOTE — HPI
Patient is a 34 year old female with PMH of uncontrolled IDDM2 (A1C ~11), DKA  presented to ED with intractable nausea, NBNB emesis over last 3 days. Reports associated fatigue, generalized weakness, inability to tolerate PO intake, heart burn and polyuria. She was seen in ED 2 days ago with similar symptoms and sent home on diflucan for vaginal candida. Reports positive sick contact with her children recently having flu like symptoms. Denies fever, chills, abdominal pain, diarrhea, cough, SOB, chest pain, dysuria, hematuria, back pain, tobacco or alcohol use.      Patient is found to have DKA with glucose 340 and positive AGMA. Beta hydroxybutyrate (BHB) elevated to 5. She received IVF and insulin infusion at 5 units/hr in ED.   Recent .

## 2019-05-07 NOTE — ASSESSMENT & PLAN NOTE
Likely due to recent viral illness   -No evidence of overt infection   -continue insulin infusion and IVF per DKA protocol   -BMP, mg, phos Q4 hrs and NPO while on insulin gtt   -start on basal, prandial sq insulin and diet after resolution of DKA    5/7 - symptomatically improved. may dc drip after lab this evening, and resume insulin.

## 2019-05-07 NOTE — ED NOTES
Patient incontinent of stool. Assisted to bathroom and linens changed. Will continue to monitor.

## 2019-05-07 NOTE — HOSPITAL COURSE
5/7 - patient admits to not taking her insulin on time because she forgot.  We discussed strategies to help remember.  She has been educated on diet and reports she knows what to eat./ not eat.  [place] [facility] [place]   This am her symptoms are mostly resolved.  Well repeat lab this afternoon.    5/8 - doing well,  Acidosis resolved, no N&C+V.  Reviewed need to niecy insulin with aptient, late complications, and diet.  She expressed understanding.  F/u Dr. Christian, her pcp.   Dc insulin drip start detimer 30, aspart ac  8/8/8, plus mod SS.

## 2019-05-07 NOTE — ASSESSMENT & PLAN NOTE
Recent Labs     05/07/19  0742 05/07/19  0919 05/07/19  1047 05/07/19  1158 05/07/19  1255 05/07/19  1403   POCTGLUCOSE 195* 173* 198* 243* 209* 214*     5/7 - On insulin drip presently  .  Home regimen is detimer hs 40, aspart 5/5/5, victosia 1.8 mg daily injection.

## 2019-05-07 NOTE — ED NOTES
Pt presents to ER for emesis x3 days. PT states she can not keep anything down. Pt was seen at this ER for a yeast infection on Friday and states she hasnt been able to take her pills due to vomiting.   Pt states her children were sick last week with the same s/s. Pt denies CP, SOB, dizziness, changes on bowel/bladder, trauma, chills/fevers. Skin is dry and intacted. PMX: DM PT Aox4. All questions answer. Will continue to monitor.

## 2019-05-07 NOTE — H&P
[facility] Hospital Medicine  History & Physical    Patient Name: Petrona Perdomo  MRN: [id number]  Admission Date: 5/6/2019  Attending Physician: Keith Uribe MD   Primary Care Provider: Roberto Christian NP    Hospital Medicine Team: [facility]P MED K Baron Arauz DO     Patient information was obtained from patient and ER records.     Subjective:     Principal Problem:Diabetic ketoacidosis without coma associated with type 2 diabetes mellitus    Chief Complaint:   Chief Complaint   Patient presents with    Emesis     x3 days, abdominal pain and chest pain decsribed as burning. Unable to take any OTC medications,  in triage        HPI:     Patient is a 34 year old female with PMH of uncontrolled IDDM2 (A1C ~11), DKA  presented to ED with intractable nausea, NBNB emesis over last 3 days. Reports associated fatigue, generalized weakness, inability to tolerate PO intake, heart burn and polyuria. She was seen in ED 2 days ago with similar symptoms and sent home on diflucan for vaginal candida. Reports positive sick contact with her children recently having flu like symptoms. Denies fever, chills, abdominal pain, diarrhea, cough, SOB, chest pain, dysuria, hematuria, back pain, tobacco or alcohol use.     Patient is found to have DKA with glucose 340 and positive AGMA. Beta hydroxybutyrate (BHB) elevated to 5. She received IVF and insulin infusion at 5 units/hr in ED.   Recent .     Past Medical History:   Diagnosis Date    Diabetes mellitus        No past surgical history on file.    Review of patient's allergies indicates:   Allergen Reactions    Bactrim [sulfamethoxazole-trimethoprim] Rash       No current facility-administered medications on file prior to encounter.      Current Outpatient Medications on File Prior to Encounter   Medication Sig    blood sugar diagnostic Strp 1 strip by Misc.(Non-Drug; Combo Route) route 3 (three) times daily.    cetirizine (ZYRTEC) 10 MG  tablet Take 1 tablet (10 mg total) by mouth once daily.    citalopram (CELEXA) 20 MG tablet Take 20 mg by mouth once daily.    fluconazole (DIFLUCAN) 150 MG Tab Take one tablet every three days    insulin aspart U-100 (NOVOLOG) 100 unit/mL injection Inject 5 Units into the skin 3 (three) times daily before meals. Plus sliding scale    insulin detemir U-100 (LEVEMIR) 100 unit/mL injection Inject 40 Units into the skin every evening.     lancets 30 gauge Misc 1 lancet by Misc.(Non-Drug; Combo Route) route 3 (three) times daily.    liraglutide (VICTOZA 2-ANAHI SUBQ) Inject 1.8 mg into the skin once daily.     miconazole (MICOTIN) 2 % cream Apply topically 2 (two) times daily. To affected area    omeprazole (PRILOSEC) 20 MG capsule Take 1 capsule (20 mg total) by mouth once daily.    ondansetron (ZOFRAN-ODT) 8 MG TbDL Take 1 tablet (8 mg total) by mouth every 8 (eight) hours as needed (nausea/vomiting).    sodium chloride (OCEAN) 0.65 % nasal spray 1 spray by Nasal route as needed for Congestion.     Family History     None        Tobacco Use    Smoking status: Never Smoker    Smokeless tobacco: Never Used   Substance and Sexual Activity    Alcohol use: No     Frequency: Never    Drug use: No    Sexual activity: Not on file     Review of Systems   Constitutional: Positive for fatigue. Negative for activity change, appetite change, chills, diaphoresis and fever.   HENT: Negative for congestion, dental problem, drooling, ear discharge, ear pain, facial swelling, hearing loss, mouth sores, nosebleeds, postnasal drip, rhinorrhea, sinus pressure, sneezing, sore throat, tinnitus, trouble swallowing and voice change.    Eyes: Negative for photophobia, pain, discharge, redness, itching and visual disturbance.   Respiratory: Negative for apnea, cough, choking, chest tightness, shortness of breath, wheezing and stridor.    Cardiovascular: Negative for chest pain, palpitations and leg swelling.   Gastrointestinal:  Positive for nausea and vomiting. Negative for abdominal distention, abdominal pain, anal bleeding, blood in stool, constipation, diarrhea and rectal pain.   Endocrine: Negative for cold intolerance, heat intolerance, polydipsia, polyphagia and polyuria.   Genitourinary: Negative for decreased urine volume, difficulty urinating, dyspareunia, dysuria, enuresis, flank pain, frequency, genital sores, hematuria, menstrual problem, pelvic pain, urgency, vaginal bleeding, vaginal discharge and vaginal pain.   Musculoskeletal: Negative for arthralgias, back pain, gait problem, joint swelling, myalgias, neck pain and neck stiffness.   Skin: Negative for color change, pallor, rash and wound.   Allergic/Immunologic: Negative for environmental allergies, food allergies and immunocompromised state.   Neurological: Positive for weakness (Generalized weakness ). Negative for dizziness, tremors, seizures, syncope, facial asymmetry, speech difficulty, light-headedness, numbness and headaches.   Hematological: Negative for adenopathy. Does not bruise/bleed easily.   Psychiatric/Behavioral: Negative for agitation, behavioral problems, confusion, decreased concentration, dysphoric mood, hallucinations, self-injury, sleep disturbance and suicidal ideas. The patient is not nervous/anxious and is not hyperactive.      Objective:     Vital Signs (Most Recent):  Temp: 98.3 °F (36.8 °C) (05/07/19 0149)  Pulse: 88 (05/07/19 0240)  Resp: 17 (05/07/19 0238)  BP: (!) 176/86 (05/07/19 0237)  SpO2: 100 % (05/07/19 0237) Vital Signs (24h Range):  Temp:  [98.3 °F (36.8 °C)] 98.3 °F (36.8 °C)  Pulse:  [] 88  Resp:  [17-18] 17  SpO2:  [97 %-100 %] 100 %  BP: (116-176)/(77-86) 176/86     Weight: 93 kg (205 lb)  Body mass index is 40.04 kg/m².    Physical Exam   Constitutional: She is oriented to person, place, and time. She appears well-developed and well-nourished. No distress.   HENT:   Head: Normocephalic and atraumatic.   Right Ear: External  ear normal.   Left Ear: External ear normal.   Nose: Nose normal.   Mouth/Throat: No oropharyngeal exudate.   Dry oral mucosa    Eyes: Pupils are equal, round, and reactive to light. Conjunctivae and EOM are normal. No scleral icterus.   Neck: Neck supple. No JVD present. No tracheal deviation present. No thyromegaly present.   Cardiovascular: Normal rate, regular rhythm and normal heart sounds. Exam reveals no gallop.   No murmur heard.  Pulmonary/Chest: Effort normal and breath sounds normal. No respiratory distress. She has no wheezes. She has no rales. She exhibits no tenderness.   Abdominal: Soft. Bowel sounds are normal. She exhibits no distension and no mass. There is no tenderness. There is no rebound and no guarding.   Genitourinary: No vaginal discharge found.   Musculoskeletal: She exhibits no edema or tenderness.   Lymphadenopathy:     She has no cervical adenopathy.   Neurological: She is alert and oriented to person, place, and time. She has normal reflexes. She displays normal reflexes. No cranial nerve deficit. She exhibits normal muscle tone. Coordination normal.   Skin: Skin is warm and dry. No rash noted. She is not diaphoretic. No erythema. No pallor.   Psychiatric: She has a normal mood and affect. Her behavior is normal. Judgment and thought content normal.         CRANIAL NERVES     CN III, IV, VI   Pupils are equal, round, and reactive to light.  Extraocular motions are normal.       Significant Labs:   Recent Results (from the past 24 hour(s))   POCT glucose    Collection Time: 05/06/19  7:10 PM   Result Value Ref Range    POCT Glucose 385 (H) 70 - 110 mg/dL   CBC auto differential    Collection Time: 05/06/19  8:31 PM   Result Value Ref Range    WBC 22.06 (H) 3.90 - 12.70 K/uL    RBC 4.80 4.00 - 5.40 M/uL    Hemoglobin 15.0 12.0 - 16.0 g/dL    Hematocrit 43.3 37.0 - 48.5 %    Mean Corpuscular Volume 90 82 - 98 fL    Mean Corpuscular Hemoglobin 31.3 (H) 27.0 - 31.0 pg    Mean Corpuscular  Hemoglobin Conc 34.6 32.0 - 36.0 g/dL    RDW 12.1 11.5 - 14.5 %    Platelets 324 150 - 350 K/uL    MPV 11.0 9.2 - 12.9 fL    Immature Granulocytes 0.5 0.0 - 0.5 %    Gran # (ANC) 17.9 (H) 1.8 - 7.7 K/uL    Immature Grans (Abs) 0.10 (H) 0.00 - 0.04 K/uL    Lymph # 3.0 1.0 - 4.8 K/uL    Mono # 1.0 0.3 - 1.0 K/uL    Eos # 0.0 0.0 - 0.5 K/uL    Baso # 0.07 0.00 - 0.20 K/uL    nRBC 0 0 /100 WBC    Gran% 81.2 (H) 38.0 - 73.0 %    Lymph% 13.6 (L) 18.0 - 48.0 %    Mono% 4.4 4.0 - 15.0 %    Eosinophil% 0.0 0.0 - 8.0 %    Basophil% 0.3 0.0 - 1.9 %    Differential Method Automated    Beta - Hydroxybutyrate, Serum    Collection Time: 05/06/19  8:31 PM   Result Value Ref Range    Beta-Hydroxybutyrate 5.1 (H) 0.0 - 0.5 mmol/L   Influenza A & B by Molecular    Collection Time: 05/06/19  8:36 PM   Result Value Ref Range    Influenza A, Molecular Negative Negative    Influenza B, Molecular Negative Negative    Flu A & B Source Nasal swab    Rapid strep screen    Collection Time: 05/06/19  8:36 PM   Result Value Ref Range    Rapid Strep A Screen Negative Negative   ISTAT PROCEDURE    Collection Time: 05/06/19  9:44 PM   Result Value Ref Range    POC PH 7.392 7.35 - 7.45    POC PCO2 33.1 (L) 35 - 45 mmHg    POC PO2 44 40 - 60 mmHg    POC HCO3 20.1 (L) 24 - 28 mmol/L    POC BE -5 -2 to 2 mmol/L    POC SATURATED O2 80 (L) 95 - 100 %    POC TCO2 21 (L) 24 - 29 mmol/L    Sample VENOUS     Site Other     Allens Test N/A     DelSys Room Air     Mode SPONT     FiO2 21    Urinalysis, Reflex to Urine Culture Urine, Clean Catch    Collection Time: 05/06/19 10:16 PM   Result Value Ref Range    Specimen UA Urine, Clean Catch     Color, UA Straw Yellow, Straw, Jonna    Appearance, UA Clear Clear    pH, UA 5.0 5.0 - 8.0    Specific Gravity, UA 1.030 1.005 - 1.030    Protein, UA 1+ (A) Negative    Glucose, UA 3+ (A) Negative    Ketones, UA 3+ (A) Negative    Bilirubin (UA) Negative Negative    Occult Blood UA 1+ (A) Negative    Nitrite, UA Negative  Negative    Leukocytes, UA Negative Negative   Comprehensive metabolic panel    Collection Time: 05/06/19 10:16 PM   Result Value Ref Range    Sodium 133 (L) 136 - 145 mmol/L    Potassium 3.4 (L) 3.5 - 5.1 mmol/L    Chloride 95 95 - 110 mmol/L    CO2 18 (L) 23 - 29 mmol/L    Glucose 340 (H) 70 - 110 mg/dL    BUN, Bld 19 6 - 20 mg/dL    Creatinine 0.9 0.5 - 1.4 mg/dL    Calcium 8.7 8.7 - 10.5 mg/dL    Total Protein 7.0 6.0 - 8.4 g/dL    Albumin 3.2 (L) 3.5 - 5.2 g/dL    Total Bilirubin 1.0 0.1 - 1.0 mg/dL    Alkaline Phosphatase 86 55 - 135 U/L    AST 11 10 - 40 U/L    ALT 12 10 - 44 U/L    Anion Gap 20 (H) 8 - 16 mmol/L    eGFR if African American >60.0 >60 mL/min/1.73 m^2    eGFR if non African American >60.0 >60 mL/min/1.73 m^2   Urinalysis Microscopic    Collection Time: 05/06/19 10:16 PM   Result Value Ref Range    RBC, UA 9 (H) 0 - 4 /hpf    WBC, UA 1 0 - 5 /hpf    Bacteria Rare None-Occ /hpf    Yeast, UA None None    Squam Epithel, UA 6 /hpf    Hyaline Casts, UA 0 0-1/lpf /lpf    Microscopic Comment SEE COMMENT    POCT urine pregnancy    Collection Time: 05/06/19 10:20 PM   Result Value Ref Range    POC Preg Test, Ur Negative Negative     Acceptable Yes    POCT glucose    Collection Time: 05/06/19 10:30 PM   Result Value Ref Range    POC Glucose 348 (H) 70 - 110 mg/dL    POC BUN 20 6 - 30 mg/dL    POC Creatinine 0.5 0.5 - 1.4 mg/dL    POC Sodium 132 (L) 136 - 145 mmol/L    POC Potassium 3.3 (L) 3.5 - 5.1 mmol/L    POC Chloride 95 95 - 110 mmol/L    POC TCO2 (MEASURED) 21 (L) 23 - 29 mmol/L    POC Ionized Calcium 1.03 (L) 1.06 - 1.42 mmol/L    POC Hematocrit 41 36 - 54 %PCV    Sample JYOTI    POCT glucose    Collection Time: 05/07/19  1:15 AM   Result Value Ref Range    POCT Glucose 292 (H) 70 - 110 mg/dL   Hemoglobin A1c if not done in past 3 months    Collection Time: 05/07/19  2:10 AM   Result Value Ref Range    Hemoglobin A1C 10.8 (H) 4.0 - 5.6 %    Estimated Avg Glucose 263 (H) 68 - 131 mg/dL    POCT glucose    Collection Time: 05/07/19  2:15 AM   Result Value Ref Range    POCT Glucose 285 (H) 70 - 110 mg/dL         Significant Imaging: I have reviewed all pertinent imaging results/findings within the past 24 hours.    Assessment/Plan:     Active Diagnoses:    Diagnosis Date Noted POA    PRINCIPAL PROBLEM:  Diabetic ketoacidosis without coma associated with type 2 diabetes mellitus [E11.10] 11/07/2018 Yes    Volume depletion, gastrointestinal loss [E86.9] 05/07/2019 Yes    Hypokalemia [E87.6] 05/07/2019 Yes    Leukocytosis [D72.829] 05/07/2019 Yes    Intractable vomiting with nausea [R11.2] 11/07/2018 Yes    Type 2 diabetes mellitus, with long-term current use of insulin [E11.9, Z79.4] 11/07/2018 Not Applicable      Problems Resolved During this Admission:     #DKA with history of uncontrolled IDDM2  -Likely due to recent viral illness   -No evidence of overt infection   -continue insulin infusion and IVF per DKA protocol   -BMP, mg, phos Q4 hrs and NPO while on insulin gtt   -start on basal, prandial sq insulin and diet after resolution of DKA     #Hypokalemia   -mild, K 3.4  -received KCL IV and PO   -f/u with repeat K and mag in am     #Leukocytosis   -recent viral illness  -influenza negative   -no signs of overt bacterial infection   -monitor trend     #vaginal candida   -diflucan 150 mg po x 1          VTE Risk Mitigation (From admission, onward)        Ordered     enoxaparin injection 40 mg  Daily      05/07/19 0153     IP VTE HIGH RISK PATIENT  Once      05/07/19 0314     Place OSMAN hose  Until discontinued      05/07/19 0314     Place sequential compression device  Until discontinued      05/07/19 0314            Baron Arauz DO  Department of Hospital Medicine   [facility]

## 2019-05-07 NOTE — ED PROVIDER NOTES
Encounter Date: 5/6/2019       History     Chief Complaint   Patient presents with    Emesis     x3 days, abdominal pain and chest pain decsribed as burning. Unable to take any OTC medications,  in triage     Patient is a 34-yo female with a PMHx of DMII who presents to the ED for urgent evaluation of emesis. Patient reports nonbloody, non bilious emesis x3 days with associated subjective fever and chills, nausea, body aches, and sore throat. Patient states she is unable to tolerate food and drink.  She has been holding her insulin since Thursday (4 days ago) due to emesis and decreased p.o. Intake.  She does endorse sick contacts, stating her kids were diagnosed with the flu last week.  She has not taken any medication for her pain. She denies headache, chest pain, shortness of breath, abdominal pain, diarrhea, constipation, urinary symptoms, or focal weakness.    The history is provided by the patient.     Review of patient's allergies indicates:   Allergen Reactions    Bactrim [sulfamethoxazole-trimethoprim] Rash     Past Medical History:   Diagnosis Date    Diabetes mellitus      No past surgical history on file.  No family history on file.  Social History     Tobacco Use    Smoking status: Never Smoker    Smokeless tobacco: Never Used   Substance Use Topics    Alcohol use: No     Frequency: Never    Drug use: No     Review of Systems   Constitutional: Positive for chills and fever.   HENT: Positive for sore throat. Negative for congestion, sinus pressure and sinus pain.    Eyes: Negative for visual disturbance.   Respiratory: Negative for cough and shortness of breath.    Cardiovascular: Negative for chest pain.   Gastrointestinal: Positive for nausea and vomiting. Negative for abdominal pain, constipation and diarrhea.   Genitourinary: Negative for dysuria.   Musculoskeletal: Positive for myalgias.   Skin: Negative for rash.   Neurological: Negative for dizziness, weakness and headaches.    Psychiatric/Behavioral: Negative for dysphoric mood.       Physical Exam     Initial Vitals [05/06/19 1910]   BP Pulse Resp Temp SpO2   116/77 (!) 112 18 98.3 °F (36.8 °C) 97 %      MAP       --         Physical Exam    Nursing note and vitals reviewed.  Constitutional: She appears well-developed and well-nourished. She is not diaphoretic. She has a sickly appearance. No distress.   HENT:   Head: Normocephalic and atraumatic.   Nose: Nose normal.   Mouth/Throat: Uvula is midline and oropharynx is clear and moist. Mucous membranes are dry. No oropharyngeal exudate, posterior oropharyngeal edema or posterior oropharyngeal erythema.   Eyes: Conjunctivae and EOM are normal. Pupils are equal, round, and reactive to light.   Neck: Normal range of motion. Neck supple.   Cardiovascular: Normal rate, regular rhythm, normal heart sounds and intact distal pulses.   Pulmonary/Chest: Breath sounds normal. No respiratory distress. She has no wheezes. She has no rhonchi. She has no rales.   Abdominal: Soft. Bowel sounds are normal. There is no tenderness. There is no rebound and no guarding.   Musculoskeletal: Normal range of motion. She exhibits no edema or tenderness.   Lymphadenopathy:     She has no cervical adenopathy.   Neurological: She is alert and oriented to person, place, and time. She has normal strength. GCS score is 15. GCS eye subscore is 4. GCS verbal subscore is 5. GCS motor subscore is 6.   Skin: Skin is warm and dry.   Psychiatric: She has a normal mood and affect. Thought content normal.         ED Course   Critical Care  Date/Time: 5/7/2019 3:08 PM  Performed by: Tawnya Cordero PA-C  Authorized by: Keith Uribe MD   Direct patient critical care time: 10 minutes  Additional history critical care time: 5 minutes  Ordering / reviewing critical care time: 10 minutes  Documentation critical care time: 5 minutes  Consulting other physicians critical care time: 3 minutes  Consult with family critical care  time: 3 minutes  Total critical care time (exclusive of procedural time) : 36 minutes  Critical care was necessary to treat or prevent imminent or life-threatening deterioration of the following conditions: metabolic crisis.  Critical care was time spent personally by me on the following activities: blood draw for specimens, development of treatment plan with patient or surrogate, discussions with consultants, evaluation of patient's response to treatment, examination of patient, obtaining history from patient or surrogate, ordering and performing treatments and interventions, ordering and review of laboratory studies, pulse oximetry, re-evaluation of patient's condition and review of old charts.        Labs Reviewed   CBC W/ AUTO DIFFERENTIAL - Abnormal; Notable for the following components:       Result Value    WBC 22.06 (*)     Mean Corpuscular Hemoglobin 31.3 (*)     Gran # (ANC) 17.9 (*)     Immature Grans (Abs) 0.10 (*)     Gran% 81.2 (*)     Lymph% 13.6 (*)     All other components within normal limits   URINALYSIS, REFLEX TO URINE CULTURE - Abnormal; Notable for the following components:    Protein, UA 1+ (*)     Glucose, UA 3+ (*)     Ketones, UA 3+ (*)     Occult Blood UA 1+ (*)     All other components within normal limits    Narrative:     Preferred Collection Type->Urine, Clean Catch   BETA - HYDROXYBUTYRATE, SERUM - Abnormal; Notable for the following components:    Beta-Hydroxybutyrate 5.1 (*)     All other components within normal limits   COMPREHENSIVE METABOLIC PANEL - Abnormal; Notable for the following components:    Sodium 133 (*)     Potassium 3.4 (*)     CO2 18 (*)     Glucose 340 (*)     Albumin 3.2 (*)     Anion Gap 20 (*)     All other components within normal limits   URINALYSIS MICROSCOPIC - Abnormal; Notable for the following components:    RBC, UA 9 (*)     All other components within normal limits    Narrative:     Preferred Collection Type->Urine, Clean Catch   BASIC METABOLIC PANEL  - Abnormal; Notable for the following components:    Sodium 135 (*)     CO2 17 (*)     Glucose 275 (*)     Anion Gap 20 (*)     All other components within normal limits    Narrative:     Prior to initiating insulin orders.   HEMOGLOBIN A1C - Abnormal; Notable for the following components:    Hemoglobin A1C 10.8 (*)     Estimated Avg Glucose 263 (*)     All other components within normal limits    Narrative:     Prior to initiating insulin orders.   CBC W/ AUTO DIFFERENTIAL - Abnormal; Notable for the following components:    WBC 16.99 (*)     Hematocrit 36.8 (*)     Gran # (ANC) 11.9 (*)     Immature Grans (Abs) 0.09 (*)     Mono # 1.4 (*)     All other components within normal limits   POCT GLUCOSE - Abnormal; Notable for the following components:    POCT Glucose 385 (*)     All other components within normal limits   ISTAT PROCEDURE - Abnormal; Notable for the following components:    POC PCO2 33.1 (*)     POC HCO3 20.1 (*)     POC SATURATED O2 80 (*)     POC TCO2 21 (*)     All other components within normal limits   POCT GLUCOSE - Abnormal; Notable for the following components:    POC Glucose 348 (*)     POC Sodium 132 (*)     POC Potassium 3.3 (*)     POC TCO2 (MEASURED) 21 (*)     POC Ionized Calcium 1.03 (*)     All other components within normal limits   POCT GLUCOSE - Abnormal; Notable for the following components:    POCT Glucose 292 (*)     All other components within normal limits   POCT GLUCOSE - Abnormal; Notable for the following components:    POCT Glucose 285 (*)     All other components within normal limits   POCT GLUCOSE - Abnormal; Notable for the following components:    POCT Glucose 262 (*)     All other components within normal limits   POCT GLUCOSE - Abnormal; Notable for the following components:    POCT Glucose 216 (*)     All other components within normal limits   POCT GLUCOSE - Abnormal; Notable for the following components:    POCT Glucose 236 (*)     All other components within normal  limits   POCT GLUCOSE - Abnormal; Notable for the following components:    POCT Glucose 178 (*)     All other components within normal limits   POCT GLUCOSE - Abnormal; Notable for the following components:    POCT Glucose 195 (*)     All other components within normal limits   POCT GLUCOSE - Abnormal; Notable for the following components:    POCT Glucose 173 (*)     All other components within normal limits   POCT GLUCOSE - Abnormal; Notable for the following components:    POCT Glucose 198 (*)     All other components within normal limits   POCT GLUCOSE - Abnormal; Notable for the following components:    POCT Glucose 243 (*)     All other components within normal limits   POCT GLUCOSE - Abnormal; Notable for the following components:    POCT Glucose 209 (*)     All other components within normal limits   POCT GLUCOSE - Abnormal; Notable for the following components:    POCT Glucose 214 (*)     All other components within normal limits   INFLUENZA A & B BY MOLECULAR   THROAT SCREEN, RAPID   CULTURE, STREP A,  THROAT   COMPREHENSIVE METABOLIC PANEL   POTASSIUM    Narrative:     Prior to initiating insulin orders.   BASIC METABOLIC PANEL   BASIC METABOLIC PANEL   CBC W/ AUTO DIFFERENTIAL   MAGNESIUM   PHOSPHORUS   MAGNESIUM    Narrative:     Prior to initiating insulin orders.   PHOSPHORUS    Narrative:     Prior to initiating insulin orders.   POCT GLUCOSE MONITORING CONTINUOUS   POCT GLUCOSE MONITORING CONTINUOUS          Imaging Results    None          Medical Decision Making:   History:   Old Medical Records: I decided to obtain old medical records.  Differential Diagnosis:   Differential diagnosis includes but is not limited to:  DKA, gastroenteritis, influenza, strep pharyngitis, electrolyte derangement.  Clinical Tests:   Lab Tests: Ordered and Reviewed  Radiological Study: Ordered and Reviewed  Medical Tests: Ordered and Reviewed  Other:   I have discussed this case with another health care provider.        APC / Resident Notes:   34 year old presenting 2/2 DKA. Likely cause is 2/2 medication non-compliance. Ordered BMP, LFTS, CBC, UA, VBG, BHB.  Labs showed leukocytosis (no identifiable source of infection on exam), Na 133 (corrected 137), K 3.4, Glucose 340, AG 20, BHB 5.1, pH7.392 with respiratory compensation pCO2 33.1, 3+ ketonuria. Flu swab and strep screen negative.  Patient was also hydrated with 2 liters of NS before insulin drip started on the patient. Patient started on an insulin drip per our DKA protocol (5U/hr) and observed for signs of hypoglycemia. Spoke with Dr. Arauz regarding labs, interventions, and admitted the patient to their service.  Patient informed of plan for admission and is agreeable. I have discussed patient case with my supervisory physician, who is in agreement with my assessment and plan.             Attending Attestation:     Physician Attestation Statement for NP/PA:   I discussed this assessment and plan of this patient with the NP/PA, but I did not personally examine the patient. The face to face encounter was performed by the NP/PA.                     Clinical Impression:       ICD-10-CM ICD-9-CM   1. Diabetic ketoacidosis without coma associated with type 2 diabetes mellitus E11.10 250.12         Disposition:   Disposition: Admitted  Condition: Neal Cordero PA-C  05/07/19 0127

## 2019-05-08 VITALS
WEIGHT: 198.44 LBS | OXYGEN SATURATION: 99 % | BODY MASS INDEX: 38.96 KG/M2 | TEMPERATURE: 98 F | SYSTOLIC BLOOD PRESSURE: 144 MMHG | DIASTOLIC BLOOD PRESSURE: 75 MMHG | HEART RATE: 82 BPM | RESPIRATION RATE: 18 BRPM | HEIGHT: 60 IN

## 2019-05-08 LAB
BACTERIA THROAT CULT: NORMAL
POCT GLUCOSE: 172 MG/DL (ref 70–110)
POCT GLUCOSE: 173 MG/DL (ref 70–110)
POCT GLUCOSE: 179 MG/DL (ref 70–110)
POCT GLUCOSE: 180 MG/DL (ref 70–110)
POCT GLUCOSE: 182 MG/DL (ref 70–110)
POCT GLUCOSE: 191 MG/DL (ref 70–110)
POCT GLUCOSE: 199 MG/DL (ref 70–110)
POCT GLUCOSE: 201 MG/DL (ref 70–110)
POCT GLUCOSE: 243 MG/DL (ref 70–110)

## 2019-05-08 PROCEDURE — 63600175 PHARM REV CODE 636 W HCPCS: Performed by: HOSPITALIST

## 2019-05-08 PROCEDURE — 25000003 PHARM REV CODE 250: Performed by: HOSPITALIST

## 2019-05-08 PROCEDURE — 63600175 PHARM REV CODE 636 W HCPCS: Performed by: PHYSICIAN ASSISTANT

## 2019-05-08 PROCEDURE — 99239 HOSP IP/OBS DSCHRG MGMT >30: CPT | Mod: ,,, | Performed by: HOSPITALIST

## 2019-05-08 PROCEDURE — S5571 INSULIN DISPOS PEN 3 ML: HCPCS | Performed by: HOSPITALIST

## 2019-05-08 PROCEDURE — 99239 PR HOSPITAL DISCHARGE DAY,>30 MIN: ICD-10-PCS | Mod: ,,, | Performed by: HOSPITALIST

## 2019-05-08 RX ORDER — ACETAMINOPHEN 325 MG/1
650 TABLET ORAL EVERY 6 HOURS PRN
Refills: 0 | COMMUNITY
Start: 2019-05-08

## 2019-05-08 RX ORDER — INSULIN ASPART 100 [IU]/ML
1-10 INJECTION, SOLUTION INTRAVENOUS; SUBCUTANEOUS
Qty: 9 ML | Refills: 11 | Status: SHIPPED | OUTPATIENT
Start: 2019-05-08 | End: 2019-05-08 | Stop reason: SDUPTHER

## 2019-05-08 RX ORDER — PANTOPRAZOLE SODIUM 40 MG/1
40 TABLET, DELAYED RELEASE ORAL DAILY
Qty: 30 TABLET | Refills: 11 | Status: SHIPPED | OUTPATIENT
Start: 2019-05-09 | End: 2020-05-08

## 2019-05-08 RX ORDER — INSULIN ASPART 100 [IU]/ML
8 INJECTION, SOLUTION INTRAVENOUS; SUBCUTANEOUS
Status: DISCONTINUED | OUTPATIENT
Start: 2019-05-08 | End: 2019-05-08 | Stop reason: ALTCHOICE

## 2019-05-08 RX ORDER — INSULIN ASPART 100 [IU]/ML
8 INJECTION, SOLUTION INTRAVENOUS; SUBCUTANEOUS
Qty: 3 ML | Refills: 0 | Status: SHIPPED | OUTPATIENT
Start: 2019-05-08 | End: 2019-05-08

## 2019-05-08 RX ORDER — INSULIN ASPART 100 [IU]/ML
5 INJECTION, SOLUTION INTRAVENOUS; SUBCUTANEOUS
Status: DISCONTINUED | OUTPATIENT
Start: 2019-05-08 | End: 2019-05-08

## 2019-05-08 RX ORDER — MICONAZOLE NITRATE 100 MG/1
100 SUPPOSITORY VAGINAL NIGHTLY
Refills: 0 | COMMUNITY
Start: 2019-05-08

## 2019-05-08 RX ORDER — INSULIN ASPART 100 [IU]/ML
8 INJECTION, SOLUTION INTRAVENOUS; SUBCUTANEOUS
Qty: 3 ML | Refills: 0 | Status: SHIPPED | OUTPATIENT
Start: 2019-05-08 | End: 2020-05-07

## 2019-05-08 RX ORDER — INSULIN ASPART 100 [IU]/ML
8 INJECTION, SOLUTION INTRAVENOUS; SUBCUTANEOUS
Status: DISCONTINUED | OUTPATIENT
Start: 2019-05-08 | End: 2019-05-08 | Stop reason: HOSPADM

## 2019-05-08 RX ORDER — INSULIN ASPART 100 [IU]/ML
1-10 INJECTION, SOLUTION INTRAVENOUS; SUBCUTANEOUS
Qty: 9 ML | Refills: 11 | Status: SHIPPED | OUTPATIENT
Start: 2019-05-08 | End: 2020-05-07

## 2019-05-08 RX ADMIN — POTASSIUM BICARBONATE 50 MEQ: 977.5 TABLET, EFFERVESCENT ORAL at 08:05

## 2019-05-08 RX ADMIN — ONDANSETRON 8 MG: 2 INJECTION INTRAMUSCULAR; INTRAVENOUS at 08:05

## 2019-05-08 RX ADMIN — PANTOPRAZOLE SODIUM 40 MG: 40 TABLET, DELAYED RELEASE ORAL at 08:05

## 2019-05-08 RX ADMIN — ACETAMINOPHEN 650 MG: 325 TABLET ORAL at 08:05

## 2019-05-08 RX ADMIN — INSULIN ASPART 8 UNITS: 100 INJECTION, SOLUTION INTRAVENOUS; SUBCUTANEOUS at 11:05

## 2019-05-08 RX ADMIN — INSULIN DETEMIR 30 UNITS: 100 INJECTION, SOLUTION SUBCUTANEOUS at 07:05

## 2019-05-08 NOTE — ASSESSMENT & PLAN NOTE
Likely due to recent viral illness   -No evidence of overt infection   -continue insulin infusion and IVF per DKA protocol   -BMP, mg, phos Q4 hrs and NPO while on insulin gtt   -start on basal, prandial sq insulin and diet after resolution of DKA    5/7 - symptomatically improved. may dc drip after lab this evening, and resume insulin.   5/8 - dc to home.  Nurse to also instruct patient and review diabetic education.

## 2019-05-08 NOTE — NURSING
VSS. PIV removed. Discharge instructions reviewed with pt. Educated pt on diabetes, how to check BG, and how to administer insulin. Pt verbalizes understanding.

## 2019-05-08 NOTE — DISCHARGE SUMMARY
[facility] Hospital Medicine  Discharge Summary      Patient Name: Petrona Perdomo  MRN: [id number]  Admission Date: 5/6/2019  Hospital Length of Stay: 1 days  Discharge Date and Time: No discharge date for patient encounter.  Attending Physician: Indira Pena MD   Discharging Provider: Indira Pena MD  Primary Care Provider: Roberto Christian NP  Hospital Medicine Team: [facility] MED  Indira Pena MD    HPI:   Patient is a 34 year old female with PMH of uncontrolled IDDM2 (A1C ~11), DKA  presented to ED with intractable nausea, NBNB emesis over last 3 days. Reports associated fatigue, generalized weakness, inability to tolerate PO intake, heart burn and polyuria. She was seen in ED 2 days ago with similar symptoms and sent home on diflucan for vaginal candida. Reports positive sick contact with her children recently having flu like symptoms. Denies fever, chills, abdominal pain, diarrhea, cough, SOB, chest pain, dysuria, hematuria, back pain, tobacco or alcohol use.      Patient is found to have DKA with glucose 340 and positive AGMA. Beta hydroxybutyrate (BHB) elevated to 5. She received IVF and insulin infusion at 5 units/hr in ED.   Recent .        * No surgery found *      Hospital Course:   5/7 - patient admits to not taking her insulin on time because she forgot.  We discussed strategies to help remember.  She has been educated on diet and reports she knows what to eat./ not eat.  [place] [facility]   This am her symptoms are mostly resolved.  Well repeat lab this afternoon.    5/8 - doing well,  Acidosis resolved, no N&C+V.  Reviewed need to niecy insulin with aptient, late complications, and diet.  She expressed understanding.  F/u Dr. Christian, her pcp.   Dc insulin drip start detimer 30, aspart ac  8/8/8, plus mod SS.      Consults:     * Diabetic ketoacidosis without coma associated with type 2 diabetes mellitus  Likely due to recent viral illness   -No  evidence of overt infection   -continue insulin infusion and IVF per DKA protocol   -BMP, mg, phos Q4 hrs and NPO while on insulin gtt   -start on basal, prandial sq insulin and diet after resolution of DKA    5/7 - symptomatically improved. may dc drip after lab this evening, and resume insulin.   5/8 - dc to home.  Nurse to also instruct patient and review diabetic education.       Type 2 diabetes mellitus, with long-term current use of insulin  Recent Labs     05/08/19  0248 05/08/19  0346 05/08/19  0447 05/08/19  0538 05/08/19  0639 05/08/19  0735   POCTGLUCOSE 180* 199* 182* 201* 191* 179*     5/7 - On insulin drip presently  .  Home regimen is detimer hs 40, aspart 5/5/5, victosia 1.8 mg daily injection.   5/8 - dc insulin drip.   det 30, aspart ac 8/8/8, + mod SS.     Intractable vomiting with nausea  5/7 - resolved      Volume depletion, gastrointestinal loss  5/7 - IVFs      Hypokalemia  5/7 - k 3.5 - resolved  5/8 - K 3.0 replaced w K lyte      Leukocytosis  Suspect demargination  Wbc 22-> 16.9      Vaginal yeast infection  monostat vag suppository      Final Active Diagnoses:    Diagnosis Date Noted POA    PRINCIPAL PROBLEM:  Diabetic ketoacidosis without coma associated with type 2 diabetes mellitus [E11.10] 11/07/2018 Yes    Type 2 diabetes mellitus, with long-term current use of insulin [E11.9, Z79.4] 11/07/2018 Not Applicable    Intractable vomiting with nausea [R11.2] 11/07/2018 Yes    Volume depletion, gastrointestinal loss [E86.9] 05/07/2019 Yes    Hypokalemia [E87.6] 05/07/2019 Yes    Leukocytosis [D72.829] 05/07/2019 Yes    Vaginal yeast infection [B37.3] 05/07/2019 Yes      Problems Resolved During this Admission:       Discharged Condition: good    Disposition: Home or Self Care    Follow Up:    Patient Instructions:      Activity as tolerated       Significant Diagnostic Studies: Labs:   CMP   Recent Labs   Lab 05/06/19  2216 05/07/19  0210 05/07/19  1541   * 135* 135*   K 3.4*  3.5  3.5 3.0*   CL 95 98 101   CO2 18* 17* 24   * 275* 178*   BUN 19 16 12   CREATININE 0.9 0.9 0.8   CALCIUM 8.7 8.9 8.7   PROT 7.0  --   --    ALBUMIN 3.2*  --   --    BILITOT 1.0  --   --    ALKPHOS 86  --   --    AST 11  --   --    ALT 12  --   --    ANIONGAP 20* 20* 10   ESTGFRAFRICA >60.0 >60.0 >60.0   EGFRNONAA >60.0 >60.0 >60.0    and CBC   Recent Labs   Lab 05/06/19  2031  05/07/19  0701 05/07/19  1541   WBC 22.06*  --  16.99* 13.55*   HGB 15.0  --  12.9 14.2   HCT 43.3   < > 36.8* 41.2     --  254 282    < > = values in this interval not displayed.       Pending Diagnostic Studies:     None         Medications:  Reconciled Home Medications:      Medication List      START taking these medications    acetaminophen 325 MG tablet  Commonly known as:  TYLENOL  Take 2 tablets (650 mg total) by mouth every 6 (six) hours as needed.     * insulin aspart U-100 100 unit/mL (3 mL) Inpn pen  Commonly known as:  NovoLOG  Inject 8 Units into the skin 3 (three) times daily before meals.  Replaces:  insulin aspart U-100 100 unit/mL injection     * insulin aspart U-100 100 unit/mL injection  Commonly known as:  NovoLOG U-100 Insulin aspart  Inject 1-10 Units into the skin 3 (three) times daily before meals. Sliding scale, medium dose with meals  aspart insulin    If Blood sugar 151-200  2 units     201-250 4 units    251-300 6 units    301-350 8 unitis  greater than  351  10  units and call M.D.     insulin detemir U-100 100 unit/mL (3 mL) Inpn pen  Commonly known as:  LEVEMIR FLEXTOUCH  Inject 30 Units into the skin once daily.  Start taking on:  5/9/2019  Replaces:  insulin detemir U-100 100 unit/mL injection     pantoprazole 40 MG tablet  Commonly known as:  PROTONIX  Take 1 tablet (40 mg total) by mouth once daily.  Start taking on:  5/9/2019  Replaces:  omeprazole 20 MG capsule         * This list has 2 medication(s) that are the same as other medications prescribed for you. Read the directions  carefully, and ask your doctor or other care provider to review them with you.            CHANGE how you take these medications    * miconazole 2 % cream  Commonly known as:  MICOTIN  Apply topically 2 (two) times daily. To affected area  What changed:  Another medication with the same name was added. Make sure you understand how and when to take each.     * miconazole 100 mg vaginal suppository  Commonly known as:  MICOTIN  Place 1 suppository (100 mg total) vaginally every evening.  What changed:  You were already taking a medication with the same name, and this prescription was added. Make sure you understand how and when to take each.         * This list has 2 medication(s) that are the same as other medications prescribed for you. Read the directions carefully, and ask your doctor or other care provider to review them with you.            CONTINUE taking these medications    blood sugar diagnostic Strp  1 strip by Misc.(Non-Drug; Combo Route) route 3 (three) times daily.     citalopram 20 MG tablet  Commonly known as:  CELEXA  Take 20 mg by mouth once daily.     fluconazole 150 MG Tab  Commonly known as:  DIFLUCAN  Take one tablet every three days     lancets 30 gauge Misc  1 lancet by Misc.(Non-Drug; Combo Route) route 3 (three) times daily.        STOP taking these medications    cetirizine 10 MG tablet  Commonly known as:  ZYRTEC     insulin aspart U-100 100 unit/mL injection  Commonly known as:  NOVOLOG  Replaced by:  insulin aspart U-100 100 unit/mL (3 mL) Inpn pen     insulin detemir U-100 100 unit/mL injection  Commonly known as:  LEVEMIR  Replaced by:  insulin detemir U-100 100 unit/mL (3 mL) Inpn pen     omeprazole 20 MG capsule  Commonly known as:  PRILOSEC  Replaced by:  pantoprazole 40 MG tablet     ondansetron 8 MG Tbdl  Commonly known as:  ZOFRAN-ODT     sodium chloride 0.65 % nasal spray  Commonly known as:  OCEAN     VICTOZA 2-ANAHI SUBQ            Indwelling Lines/Drains at time of discharge:    Lines/Drains/Airways          None          Time spent on the discharge of patient: 35  minutes  Patient was seen and examined on the date of discharge and determined to be suitable for discharge.         Indira Pena MD  Department of Hospital Medicine  [facility]

## 2019-05-08 NOTE — ASSESSMENT & PLAN NOTE
Recent Labs     05/08/19  0248 05/08/19  0346 05/08/19  0447 05/08/19  0538 05/08/19  0639 05/08/19  0735   POCTGLUCOSE 180* 199* 182* 201* 191* 179*     5/7 - On insulin drip presently  .  Home regimen is detimer hs 40, aspart 5/5/5, victosia 1.8 mg daily injection.   5/8 - dc insulin drip.   det 30, aspart ac 8/8/8, + mod SS.

## 2019-05-08 NOTE — PLAN OF CARE
Patient to discharge home with spouse, no needs.     Roberto Christian NP  On 5/15/2019  hospital f/i @ 9:30 am  [address] [address]  [phone]     Payor: MEDICAID / Plan: [facility] COMMUNITY PLAN [email or web address] ([place] MEDICAID) / Product Type: Managed Medicaid /         05/08/19 1403   Final Note   Assessment Type Final Discharge Note   What phone number can be called within the next 1-3 days to see how you are doing after discharge?   (mother Ashia [phone])   Hospital Follow Up  Appt(s) scheduled? Yes   Discharge plans and expectations educations in teach back method with documentation complete? Yes  (per staff nurse)   Right Care Referral Info   Post Acute Recommendation No Care     Cleopatra Perez RN/BSN/CM  [facility]  [phone]  Ortho/[facility]

## 2019-05-08 NOTE — PLAN OF CARE
Admit Diabetic ketoacidosis. This CM met with patient at bedside. Patient lives with spouse who will provide transport at discharge. Discharge plan home with pcp follow-up.      [id number] [place] - [address]  [address]  [place] [address]  Phone: [phone] Fax: [phone]    Payor: MEDICAID / Plan: [facility] COMMUNITY PLAN [facility] ([place] MEDICAID) / Product Type: Managed Medicaid /         05/08/19 1128   Discharge Assessment   Assessment Type Discharge Planning Assessment   Confirmed/corrected address and phone number on facesheet? Yes   Assessment information obtained from? Patient   Expected Length of Stay (days) 3   Communicated expected length of stay with patient/caregiver yes   Prior to hospitilization cognitive status: Alert/Oriented   Prior to hospitalization functional status: Independent   Current cognitive status: Alert/Oriented   Current Functional Status: Independent   Facility Arrived From:   (brought in by spouse)   Lives With spouse   Able to Return to Prior Arrangements yes   Is patient able to care for self after discharge? Yes   Who are your caregiver(s) and their phone number(s)?   (mother Ashia [phone])   Patient's perception of discharge disposition admitted as an inpatient;home or selfcare   Readmission Within the Last 30 Days no previous admission in last 30 days   Patient currently being followed by outpatient case management? No   Patient currently receives any other outside agency services? No   Equipment Currently Used at Home none   Do you have any problems affording any of your prescribed medications? No   Is the patient taking medications as prescribed? yes   Does the patient have transportation home? Yes   Transportation Anticipated family or friend will provide   Does the patient receive services at the [facility] Clinic? No   Discharge Plan A Home with family   Discharge Plan B Home   DME Needed Upon Discharge  none   Patient/Family in Agreement with Plan yes      Cleopatra Perez RN/BSN/ALLEGRA  [facility]  [phone]  Ortho/[facility]

## 2019-05-08 NOTE — PLAN OF CARE
Problem: Adult Inpatient Plan of Care  Goal: Plan of Care Review  Pt is AAOx4, vital signs are stable. Pt on Insulin gtt, accuchecks done hourly. No complaints of pain during shift.  Pt up to restroom with stand by assist. Pt had 2 BMs overnight. Call light and personal belongings within reach. Plan of care reviewed with pt, all questions and concerns were addressed. Will continue to monitor.

## 2019-05-09 ENCOUNTER — PATIENT OUTREACH (OUTPATIENT)
Dept: ADMINISTRATIVE | Facility: CLINIC | Age: 34
End: 2019-05-09

## 2019-05-10 NOTE — TELEPHONE ENCOUNTER
"--late entry--  5/09/2019 @1533hrs C3 nurse spoke with Petrona Perdomo at which time she requested that I call her back "tomorrow."  5/10/2019 @1618 hrs [facility] nurse spoke with Petrona Perdomo for a [facility] post hospital discharge follow up call. The patient has a scheduled HOSFU appointment with Roberto Christian NP on 5/15/2019 (patient stated during call).    Celina Cotto, RN  [facility]    "

## 2021-06-12 ENCOUNTER — OFFICE VISIT (OUTPATIENT)
Dept: URGENT CARE | Facility: CLINIC | Age: 36
End: 2021-06-12
Payer: MEDICAID

## 2021-06-12 VITALS
WEIGHT: 199 LBS | HEIGHT: 60 IN | SYSTOLIC BLOOD PRESSURE: 118 MMHG | BODY MASS INDEX: 39.07 KG/M2 | RESPIRATION RATE: 16 BRPM | TEMPERATURE: 98 F | HEART RATE: 106 BPM | DIASTOLIC BLOOD PRESSURE: 68 MMHG | OXYGEN SATURATION: 97 %

## 2021-06-12 DIAGNOSIS — R30.0 DYSURIA: ICD-10-CM

## 2021-06-12 DIAGNOSIS — N30.01 ACUTE CYSTITIS WITH HEMATURIA: Primary | ICD-10-CM

## 2021-06-12 DIAGNOSIS — N39.41 URGENCY INCONTINENCE: ICD-10-CM

## 2021-06-12 LAB
B-HCG UR QL: NEGATIVE
BILIRUB UR QL STRIP: NEGATIVE
CTP QC/QA: YES
GLUCOSE UR QL STRIP: POSITIVE
KETONES UR QL STRIP: NEGATIVE
LEUKOCYTE ESTERASE UR QL STRIP: POSITIVE
PH, POC UA: 5 (ref 5–8)
POC BLOOD, URINE: POSITIVE
POC NITRATES, URINE: NEGATIVE
PROT UR QL STRIP: POSITIVE
SP GR UR STRIP: 1.02 (ref 1–1.03)
UROBILINOGEN UR STRIP-ACNC: ABNORMAL (ref 0.1–1.1)

## 2021-06-12 PROCEDURE — 81025 URINE PREGNANCY TEST: CPT | Mod: S$GLB,,, | Performed by: NURSE PRACTITIONER

## 2021-06-12 PROCEDURE — 81003 URINALYSIS AUTO W/O SCOPE: CPT | Mod: QW,S$GLB,, | Performed by: NURSE PRACTITIONER

## 2021-06-12 PROCEDURE — 81025 POCT URINE PREGNANCY: ICD-10-PCS | Mod: S$GLB,,, | Performed by: NURSE PRACTITIONER

## 2021-06-12 PROCEDURE — 99204 PR OFFICE/OUTPT VISIT, NEW, LEVL IV, 45-59 MIN: ICD-10-PCS | Mod: 25,S$GLB,, | Performed by: NURSE PRACTITIONER

## 2021-06-12 PROCEDURE — 99204 OFFICE O/P NEW MOD 45 MIN: CPT | Mod: 25,S$GLB,, | Performed by: NURSE PRACTITIONER

## 2021-06-12 PROCEDURE — 81003 POCT URINALYSIS, DIPSTICK, AUTOMATED, W/O SCOPE: ICD-10-PCS | Mod: QW,S$GLB,, | Performed by: NURSE PRACTITIONER

## 2021-06-12 RX ORDER — ATORVASTATIN CALCIUM 40 MG/1
TABLET, FILM COATED ORAL
COMMUNITY

## 2021-06-12 RX ORDER — INSULIN ASPART 100 [IU]/ML
INJECTION, SOLUTION INTRAVENOUS; SUBCUTANEOUS
COMMUNITY

## 2021-06-12 RX ORDER — FLUCONAZOLE 150 MG/1
150 TABLET ORAL DAILY
Qty: 1 TABLET | Refills: 1 | Status: SHIPPED | OUTPATIENT
Start: 2021-06-12 | End: 2021-06-13

## 2021-06-12 RX ORDER — NITROFURANTOIN 25; 75 MG/1; MG/1
100 CAPSULE ORAL 2 TIMES DAILY
Qty: 10 CAPSULE | Refills: 0 | Status: SHIPPED | OUTPATIENT
Start: 2021-06-12 | End: 2021-06-17

## 2021-06-12 RX ORDER — PHENAZOPYRIDINE HYDROCHLORIDE 200 MG/1
200 TABLET, FILM COATED ORAL 3 TIMES DAILY PRN
Qty: 21 TABLET | Refills: 0 | Status: SHIPPED | OUTPATIENT
Start: 2021-06-12

## 2021-06-12 RX ORDER — INSULIN DETEMIR 100 [IU]/ML
INJECTION, SOLUTION SUBCUTANEOUS
COMMUNITY

## 2021-06-14 LAB
BACTERIA UR CULT: NORMAL
BACTERIA UR CULT: NORMAL

## 2021-06-15 ENCOUNTER — TELEPHONE (OUTPATIENT)
Dept: URGENT CARE | Facility: CLINIC | Age: 36
End: 2021-06-15

## 2021-06-18 ENCOUNTER — TELEPHONE (OUTPATIENT)
Dept: URGENT CARE | Facility: CLINIC | Age: 36
End: 2021-06-18

## 2022-01-31 ENCOUNTER — OFFICE VISIT (OUTPATIENT)
Dept: URGENT CARE | Facility: CLINIC | Age: 37
End: 2022-01-31
Payer: MEDICAID

## 2022-01-31 VITALS
WEIGHT: 199 LBS | SYSTOLIC BLOOD PRESSURE: 119 MMHG | HEIGHT: 60 IN | OXYGEN SATURATION: 98 % | HEART RATE: 94 BPM | BODY MASS INDEX: 39.07 KG/M2 | RESPIRATION RATE: 16 BRPM | TEMPERATURE: 99 F | DIASTOLIC BLOOD PRESSURE: 80 MMHG

## 2022-01-31 DIAGNOSIS — L03.317 ABSCESS AND CELLULITIS OF GLUTEAL REGION: Primary | ICD-10-CM

## 2022-01-31 DIAGNOSIS — L02.31 ABSCESS AND CELLULITIS OF GLUTEAL REGION: Primary | ICD-10-CM

## 2022-01-31 PROCEDURE — 1160F PR REVIEW ALL MEDS BY PRESCRIBER/CLIN PHARMACIST DOCUMENTED: ICD-10-PCS | Mod: CPTII,S$GLB,, | Performed by: NURSE PRACTITIONER

## 2022-01-31 PROCEDURE — 3008F BODY MASS INDEX DOCD: CPT | Mod: CPTII,S$GLB,, | Performed by: NURSE PRACTITIONER

## 2022-01-31 PROCEDURE — 3079F DIAST BP 80-89 MM HG: CPT | Mod: CPTII,S$GLB,, | Performed by: NURSE PRACTITIONER

## 2022-01-31 PROCEDURE — 1159F PR MEDICATION LIST DOCUMENTED IN MEDICAL RECORD: ICD-10-PCS | Mod: CPTII,S$GLB,, | Performed by: NURSE PRACTITIONER

## 2022-01-31 PROCEDURE — 99213 OFFICE O/P EST LOW 20 MIN: CPT | Mod: S$GLB,,, | Performed by: NURSE PRACTITIONER

## 2022-01-31 PROCEDURE — 1159F MED LIST DOCD IN RCRD: CPT | Mod: CPTII,S$GLB,, | Performed by: NURSE PRACTITIONER

## 2022-01-31 PROCEDURE — 1160F RVW MEDS BY RX/DR IN RCRD: CPT | Mod: CPTII,S$GLB,, | Performed by: NURSE PRACTITIONER

## 2022-01-31 PROCEDURE — 3079F PR MOST RECENT DIASTOLIC BLOOD PRESSURE 80-89 MM HG: ICD-10-PCS | Mod: CPTII,S$GLB,, | Performed by: NURSE PRACTITIONER

## 2022-01-31 PROCEDURE — 99213 PR OFFICE/OUTPT VISIT, EST, LEVL III, 20-29 MIN: ICD-10-PCS | Mod: S$GLB,,, | Performed by: NURSE PRACTITIONER

## 2022-01-31 PROCEDURE — 3074F SYST BP LT 130 MM HG: CPT | Mod: CPTII,S$GLB,, | Performed by: NURSE PRACTITIONER

## 2022-01-31 PROCEDURE — 3008F PR BODY MASS INDEX (BMI) DOCUMENTED: ICD-10-PCS | Mod: CPTII,S$GLB,, | Performed by: NURSE PRACTITIONER

## 2022-01-31 PROCEDURE — 3074F PR MOST RECENT SYSTOLIC BLOOD PRESSURE < 130 MM HG: ICD-10-PCS | Mod: CPTII,S$GLB,, | Performed by: NURSE PRACTITIONER

## 2022-01-31 RX ORDER — FLUCONAZOLE 150 MG/1
150 TABLET ORAL DAILY
Qty: 4 TABLET | Refills: 0 | Status: SHIPPED | OUTPATIENT
Start: 2022-01-31 | End: 2022-02-01

## 2022-01-31 RX ORDER — DOXYCYCLINE 100 MG/1
100 CAPSULE ORAL EVERY 12 HOURS
Qty: 14 CAPSULE | Refills: 0 | Status: SHIPPED | OUTPATIENT
Start: 2022-01-31 | End: 2022-02-07

## 2022-01-31 NOTE — PROGRESS NOTES
Subjective:       Patient ID: Petrona Perdomo is a 36 y.o. female.    Vitals:  height is 5' (1.524 m) and weight is 90.3 kg (199 lb). Her temporal temperature is 98.5 °F (36.9 °C). Her blood pressure is 119/80 and her pulse is 94. Her respiration is 16 and oxygen saturation is 98%.     Chief Complaint: Recurrent Skin Infections    36-year-old female presents to clinic for evaluation of recurrent abscess to left buttock ox.  Patient states that she has had area drained before, has been put antibiotics.  Patient states that she has been taking warm Sitz baths.  She states that she has started taking some leftover antibiotics, with relief.  She denies fever.  She is awake and alert, answers questions appropriately, has no other complaints, and is otherwise healthy on today's visit.    Rash  This is a recurrent problem. The problem is unchanged. The affected locations include the left buttock and right buttock. The rash is characterized by draining, redness and pain. It is unknown if there was an exposure to a precipitant. Pertinent negatives include no anorexia, congestion, cough, diarrhea, eye pain, facial edema, fatigue, fever, joint pain, nail changes, rhinorrhea, shortness of breath, sore throat or vomiting. Past treatments include anti-itch cream and antibiotic cream.       Constitution: Negative for activity change, appetite change, chills, sweating, fatigue and fever.   HENT: Negative for congestion and sore throat.    Cardiovascular: Negative for chest pain.   Eyes: Negative for eye pain.   Respiratory: Negative for cough and shortness of breath.    Gastrointestinal: Negative for vomiting and diarrhea.   Skin: Positive for erythema and abscess. Negative for rash.   Neurological: Negative for dizziness.       Objective:      Physical Exam   Constitutional: She is oriented to person, place, and time. She appears well-developed and well-nourished.  Non-toxic appearance. She does not appear ill. No distress.    HENT:   Head: Normocephalic and atraumatic. Head is without abrasion, without contusion and without laceration.   Ears:   Right Ear: External ear normal.   Left Ear: External ear normal.   Nose: Nose normal.   Mouth/Throat: Oropharynx is clear and moist and mucous membranes are normal.   Eyes: Conjunctivae, EOM and lids are normal.   Neck: Trachea normal and phonation normal. Neck supple.   Cardiovascular: Normal rate, regular rhythm and normal heart sounds.   Pulmonary/Chest: Effort normal. No respiratory distress.   Abdominal: Normal appearance.   Musculoskeletal: Normal range of motion.         General: Normal range of motion.   Neurological: She is alert and oriented to person, place, and time.   Skin: Skin is warm, dry, intact, not diaphoretic, no rash and abscessed. Capillary refill takes less than 2 seconds. erythema No abrasion, No burn, No bruising and No ecchymosis        Psychiatric: She has a normal mood and affect. Her speech is normal and behavior is normal. Mood, judgment and thought content normal. Cognition and memory  Nursing note and vitals reviewed.          Assessment:       1. Abscess and cellulitis of gluteal region          Plan:         Abscess and cellulitis of gluteal region  -     doxycycline (VIBRAMYCIN) 100 MG Cap; Take 1 capsule (100 mg total) by mouth every 12 (twelve) hours. for 7 days  Dispense: 14 capsule; Refill: 0  -     fluconazole (DIFLUCAN) 150 MG Tab; Take 1 tablet (150 mg total) by mouth once daily. Then take the next tablet in 72 hours for 1 day  Dispense: 4 tablet; Refill: 0  - complete entire course of antibiotics as directed.  Return to clinic for worsening symptoms.  Follow-up with PCP as needed.  Patient verbalized understanding and in agreement with plan.                                               Abscess/Cellulitis   If your condition worsens or fails to improve we recommend that you receive another evaluation at the ER immediately or contact your PCP to discuss  your concerns or return here. You must understand that you've received an urgent care treatment only and that you may be released before all your medical problems are known or treated. You the patient will arrange for follouwp care as instructed.     Soak affected in warm water with epsom salts several times a day. Dilute 2 cups per gallon of water. If you cannot soak  apply warm compresses to the affected area for 20 min, 3-5 times per day and apply warm compresses frequently. If you cannot soak, use the shower head.  Use warm compresses for 20 minutes 3-5 times a day. Avoid picking or manipulating the wound. Clean the wound twice a day and put the antibiotic ointment on it. You should seek ER treatment if fever, increase pain, swelling or other signs of increasing infection.   If you were prescribed antibiotics, please take them to completion  If you are female and on BCP use additional methods to prevent pregnancy while on antibiotics and for one cycle after.   If you were given narcotics do not drive or operate heavy equipment or machinery while taking these medications.   Tylenol or ibuprofen can also be used as directed for pain unless you have an allergy to them or medical condition such as stomach ulcers, kidney or liver disease or blood thinners etc for which you should not be taking these type of medications.   Return in 48- 72 hours for recheck.     Patient Instructions   - You must understand that you have received an Urgent Care treatment only and that you may be released before all of your medical problems are known or treated.   - You, the patient, will arrange for follow up care as instructed.   - If your condition worsens or fails to improve we recommend that you receive another evaluation at the ER immediately or contact your PCP to discuss your concerns or return here.     Patient Education       Skin Abscess   About this topic   An abscess is a collection of pus under your skin. The doctors made  a cut to open up the area and clean out the wound. Most of the time, the cut is not closed with stitches. The wound may be packed with a special gauze to keep it from closing too soon. Sometimes a small drain is placed.     What are the causes?   An abscess is caused by germs that enter the body through a break in your skin or through the base of a hair and cause infection. An ingrown hair can also cause an abscess.  What can make this more likely to happen?   You are more likely to have an abscess if you:  · Have a skin infection or are in close contact with someone with a skin infection  · Have long term skin problems such as acne or eczema  · Are a diabetic  · Are overweight  · Have swelling in your body  · Have a weak immune system  · Have poor hygiene or poor dental hygiene  What are the main signs?   You may have an area of skin that is red, raised, and warm to touch. It may be sore when you touch it. There may be pus coming out or an area of pus under the skin. Fever, chills, or upset stomach are not common.  How does the doctor diagnose this health problem?   Your doctor will ask you questions and do an exam. The doctor may take a sample of the fluid from the abscess to run a test to learn what germ is causing the problem. The doctor may order:  · Lab tests  · Ultrasound  · CT scan  · MRI  How does the doctor treat this health problem?   Some abscesses are treated with warm compresses. This means you would wet a small towel with warm water and place it on the abscess. The doctor needs to drain other abscesses to get rid of the fluid. You may need to take antibiotics as a pill. Some people need surgery and IV antibiotics for very bad abscesses.  What drugs may be needed?   The doctor may order drugs to:  · Help with pain and swelling  · Fight an infection  What problems could happen?   · Infection can spread  · Another abscess can form  · Infection is not able to be treated by normal drugs  What can be done  to prevent this health problem?   · Practice proper hygiene. Practice good hand washing, especially before and after touching the cut site.  · Do not share towels, razors, and other personal things with someone else.  · Be careful when you shave your face, underarms, or legs to avoid scratching or cutting your skin.  · If you cut yourself, keep the area clean and put petroleum jelly on it.  · Wear compression stockings if swelling causes your infections.  · If your infection is caused by MRSA, ask your doctor how to try to get rid of this bacteria.  Where can I learn more?   Kids Health  http://kidshealth.org/parent/infections/skin/abscess.html   [facility] Choices  http://www.nhs.uk/Conditions/Abscess/Pages/Treatment.aspx   Last Reviewed Date   2021-09-09  Consumer Information Use and Disclaimer   This information is not specific medical advice and does not replace information you receive from your health care provider. This is only a brief summary of general information. It does NOT include all information about conditions, illnesses, injuries, tests, procedures, treatments, therapies, discharge instructions or life-style choices that may apply to you. You must talk with your health care provider for complete information about your health and treatment options. This information should not be used to decide whether or not to accept your health care providers advice, instructions or recommendations. Only your health care provider has the knowledge and training to provide advice that is right for you.  Copyright   Copyright © 2021 [employer] and its affiliates and/or licensors. All rights reserved.  Patient Education       Cellulitis (Skin Infection) Discharge Instructions, Adult   About this topic   Cellulitis is a skin infection. All people have germs on their skin. Most of the time, these germs do not cause a problem. A skin infection means the germs have gotten into the layers of your skin. Germs can enter your  body from a cut, scratch, or bite. The infected part gets red, warm, painful, swollen, and irritated. You need antibiotics to treat the infection. It is important to take all of your antibiotics even if you start to feel better. If not, your infection may come back and be more serious than before.     What care is needed at home?   · Ask your doctor what you need to do when you go home. Make sure you ask questions if you do not understand what the doctor says. This way you will know what you need to do.  · Prop your painful body part on pillows, keeping it above the level of your heart. This may help lessen pain and swelling.  · Keep your infected area clean and dry. Do not squeeze, scratch, or rub it. You can gently wash the area with soap and water or take a shower. Pat the area dry with a clean towel.  · Wash your hands before and after you touch your infected area.  · Do not put an antibiotic ointment on the infected area.  · Use clean, warm compresses to help ease pain and swelling. Wet a clean wash cloth or small towel with hot water. Put it on the area for 5 to 10 minutes.  · You can draw a line with a waterproof marker around the red area to see if it is getting bigger or smaller.  What follow-up care is needed?   · Your doctor may ask you to make visits to the office to check on your progress. Be sure to keep these visits.  · If surgery was needed to drain the infection, your doctor may not stitch the area closed. Keep this area clean and covered.  · If you do have stitches or staples, you will need to have them taken out. Your doctor will often want to do this in 1 to 2 weeks.  What drugs may be needed?   The doctor may order drugs to:  · Treat the infection. Finish all of the antibiotics, even if the infection appears to have gone away.  · Help with pain and swelling  Will physical activity be limited?   Your activity may be decreased if a leg or arm is infected. It may be painful to move that part of the  body. You may not feel well for a few days until the drug to treat the infection starts working.  What problems could happen?   · Infection in the blood. This is sepsis and is very serious.  · Bone infection  · Inflammation of the lymph vessels  · Inflammation of the heart  · Meningitis  · Shock  · Tissue death or gangrene  What can be done to prevent this health problem?   · Wear proper clothing and shoes to cover your body and prevent cuts and bruises.  · Keep your nails trimmed to avoid scratches.  · Keep skin moisturized. Use lotion that does not have fragrance added to avoid dry, cracked skin.  · Treat athlete's foot as directed by your doctor.  · If you have a chronic skin condition, talk with your doctor about how to keep it under the best control.  · If you have chronic swelling (edema) of an arm or leg, talk with your doctor about how to lower the swelling. Your doctor may want you to wear support stockings.  · Practice good hygiene. Wash your hands often with soap and water.       · If you often have fungal infections, ask your doctor if you should be using an antifungal drug to prevent the cellulitis from occurring again.  · If you scratch or cut your skin, wash the area carefully with soap and water.  When do I need to call the doctor?   · Signs of infection. These include a fever of 100.4°F (38°C) or higher, chills, or wound that will not heal.  · You have a fever of 100.4°F (38°C) or higher or chills.  · The area becomes more red, swollen, or painful.  · The redness or swelling spreads up your leg or arm or to a larger area.  · The infected area is not better after 3 days of taking antibiotics.  Teach Back: Helping You Understand   The Teach Back Method helps you understand the information we are giving you. After you talk with the staff, tell them in your own words what you learned. This helps to make sure the staff has described each thing clearly. It also helps to explain things that may have been  confusing. Before going home, make sure you can do these:  · I can tell you about my condition.  · I can tell you about to care for my wound.  · I can tell you what I will do if I have swelling, redness, or warmth around my wound.  Where can I learn more?   [employer]  https://familydoctor.org/condition/cellulitis/   [facility] Choices  http://www.nhs.uk/Conditions/Cellulitis/Pages/Causes.aspx   Last Reviewed Date   2021-06-03  Consumer Information Use and Disclaimer   This information is not specific medical advice and does not replace information you receive from your health care provider. This is only a brief summary of general information. It does NOT include all information about conditions, illnesses, injuries, tests, procedures, treatments, therapies, discharge instructions or life-style choices that may apply to you. You must talk with your health care provider for complete information about your health and treatment options. This information should not be used to decide whether or not to accept your health care providers advice, instructions or recommendations. Only your health care provider has the knowledge and training to provide advice that is right for you.  Copyright   Copyright © 2021 [employer] and its affiliates and/or licensors. All rights reserved.

## 2022-01-31 NOTE — PATIENT INSTRUCTIONS
- You must understand that you have received an Urgent Care treatment only and that you may be released before all of your medical problems are known or treated.   - You, the patient, will arrange for follow up care as instructed.   - If your condition worsens or fails to improve we recommend that you receive another evaluation at the ER immediately or contact your PCP to discuss your concerns or return here.     Patient Education       Skin Abscess   About this topic   An abscess is a collection of pus under your skin. The doctors made a cut to open up the area and clean out the wound. Most of the time, the cut is not closed with stitches. The wound may be packed with a special gauze to keep it from closing too soon. Sometimes a small drain is placed.     What are the causes?   An abscess is caused by germs that enter the body through a break in your skin or through the base of a hair and cause infection. An ingrown hair can also cause an abscess.  What can make this more likely to happen?   You are more likely to have an abscess if you:  · Have a skin infection or are in close contact with someone with a skin infection  · Have long term skin problems such as acne or eczema  · Are a diabetic  · Are overweight  · Have swelling in your body  · Have a weak immune system  · Have poor hygiene or poor dental hygiene  What are the main signs?   You may have an area of skin that is red, raised, and warm to touch. It may be sore when you touch it. There may be pus coming out or an area of pus under the skin. Fever, chills, or upset stomach are not common.  How does the doctor diagnose this health problem?   Your doctor will ask you questions and do an exam. The doctor may take a sample of the fluid from the abscess to run a test to learn what germ is causing the problem. The doctor may order:  · Lab tests  · Ultrasound  · CT scan  · MRI  How does the doctor treat this health problem?   Some abscesses are treated with warm  compresses. This means you would wet a small towel with warm water and place it on the abscess. The doctor needs to drain other abscesses to get rid of the fluid. You may need to take antibiotics as a pill. Some people need surgery and IV antibiotics for very bad abscesses.  What drugs may be needed?   The doctor may order drugs to:  · Help with pain and swelling  · Fight an infection  What problems could happen?   · Infection can spread  · Another abscess can form  · Infection is not able to be treated by normal drugs  What can be done to prevent this health problem?   · Practice proper hygiene. Practice good hand washing, especially before and after touching the cut site.  · Do not share towels, razors, and other personal things with someone else.  · Be careful when you shave your face, underarms, or legs to avoid scratching or cutting your skin.  · If you cut yourself, keep the area clean and put petroleum jelly on it.  · Wear compression stockings if swelling causes your infections.  · If your infection is caused by MRSA, ask your doctor how to try to get rid of this bacteria.  Where can I learn more?   Kids Health  http://kidshealth.org/parent/infections/skin/abscess.html   NHS Choices  http://www.nhs.uk/Conditions/Abscess/Pages/Treatment.aspx   Last Reviewed Date   2021-09-09  Consumer Information Use and Disclaimer   This information is not specific medical advice and does not replace information you receive from your health care provider. This is only a brief summary of general information. It does NOT include all information about conditions, illnesses, injuries, tests, procedures, treatments, therapies, discharge instructions or life-style choices that may apply to you. You must talk with your health care provider for complete information about your health and treatment options. This information should not be used to decide whether or not to accept your health care providers advice, instructions or  recommendations. Only your health care provider has the knowledge and training to provide advice that is right for you.  Copyright   Copyright © 2021 [employer] and its affiliates and/or licensors. All rights reserved.  Patient Education       Cellulitis (Skin Infection) Discharge Instructions, Adult   About this topic   Cellulitis is a skin infection. All people have germs on their skin. Most of the time, these germs do not cause a problem. A skin infection means the germs have gotten into the layers of your skin. Germs can enter your body from a cut, scratch, or bite. The infected part gets red, warm, painful, swollen, and irritated. You need antibiotics to treat the infection. It is important to take all of your antibiotics even if you start to feel better. If not, your infection may come back and be more serious than before.     What care is needed at home?   · Ask your doctor what you need to do when you go home. Make sure you ask questions if you do not understand what the doctor says. This way you will know what you need to do.  · Prop your painful body part on pillows, keeping it above the level of your heart. This may help lessen pain and swelling.  · Keep your infected area clean and dry. Do not squeeze, scratch, or rub it. You can gently wash the area with soap and water or take a shower. Pat the area dry with a clean towel.  · Wash your hands before and after you touch your infected area.  · Do not put an antibiotic ointment on the infected area.  · Use clean, warm compresses to help ease pain and swelling. Wet a clean wash cloth or small towel with hot water. Put it on the area for 5 to 10 minutes.  · You can draw a line with a waterproof marker around the red area to see if it is getting bigger or smaller.  What follow-up care is needed?   · Your doctor may ask you to make visits to the office to check on your progress. Be sure to keep these visits.  · If surgery was needed to drain the infection,  your doctor may not stitch the area closed. Keep this area clean and covered.  · If you do have stitches or staples, you will need to have them taken out. Your doctor will often want to do this in 1 to 2 weeks.  What drugs may be needed?   The doctor may order drugs to:  · Treat the infection. Finish all of the antibiotics, even if the infection appears to have gone away.  · Help with pain and swelling  Will physical activity be limited?   Your activity may be decreased if a leg or arm is infected. It may be painful to move that part of the body. You may not feel well for a few days until the drug to treat the infection starts working.  What problems could happen?   · Infection in the blood. This is sepsis and is very serious.  · Bone infection  · Inflammation of the lymph vessels  · Inflammation of the heart  · Meningitis  · Shock  · Tissue death or gangrene  What can be done to prevent this health problem?   · Wear proper clothing and shoes to cover your body and prevent cuts and bruises.  · Keep your nails trimmed to avoid scratches.  · Keep skin moisturized. Use lotion that does not have fragrance added to avoid dry, cracked skin.  · Treat athlete's foot as directed by your doctor.  · If you have a chronic skin condition, talk with your doctor about how to keep it under the best control.  · If you have chronic swelling (edema) of an arm or leg, talk with your doctor about how to lower the swelling. Your doctor may want you to wear support stockings.  · Practice good hygiene. Wash your hands often with soap and water.       · If you often have fungal infections, ask your doctor if you should be using an antifungal drug to prevent the cellulitis from occurring again.  · If you scratch or cut your skin, wash the area carefully with soap and water.  When do I need to call the doctor?   · Signs of infection. These include a fever of 100.4°F (38°C) or higher, chills, or wound that will not heal.  · You have a fever of  100.4°F (38°C) or higher or chills.  · The area becomes more red, swollen, or painful.  · The redness or swelling spreads up your leg or arm or to a larger area.  · The infected area is not better after 3 days of taking antibiotics.  Teach Back: Helping You Understand   The Teach Back Method helps you understand the information we are giving you. After you talk with the staff, tell them in your own words what you learned. This helps to make sure the staff has described each thing clearly. It also helps to explain things that may have been confusing. Before going home, make sure you can do these:  · I can tell you about my condition.  · I can tell you about to care for my wound.  · I can tell you what I will do if I have swelling, redness, or warmth around my wound.  Where can I learn more?   American Academy [employer]  https://familydoctor.org/condition/cellulitis/   N[facility] Choices  http://www.nhs.uk/Conditions/Cellulitis/Pages/Causes.aspx   Last Reviewed Date   2021-06-03  Consumer Information Use and Disclaimer   This information is not specific medical advice and does not replace information you receive from your health care provider. This is only a brief summary of general information. It does NOT include all information about conditions, illnesses, injuries, tests, procedures, treatments, therapies, discharge instructions or life-style choices that may apply to you. You must talk with your health care provider for complete information about your health and treatment options. This information should not be used to decide whether or not to accept your health care providers advice, instructions or recommendations. Only your health care provider has the knowledge and training to provide advice that is right for you.  Copyright   Copyright © 2021 [employer], [employer] and its affiliates and/or licensors. All rights reserved.

## 2022-10-06 ENCOUNTER — OFFICE VISIT (OUTPATIENT)
Dept: URGENT CARE | Facility: CLINIC | Age: 37
End: 2022-10-06
Payer: MEDICAID

## 2022-10-06 VITALS
RESPIRATION RATE: 18 BRPM | WEIGHT: 200 LBS | DIASTOLIC BLOOD PRESSURE: 71 MMHG | TEMPERATURE: 98 F | SYSTOLIC BLOOD PRESSURE: 141 MMHG | OXYGEN SATURATION: 97 % | BODY MASS INDEX: 39.06 KG/M2 | HEART RATE: 83 BPM

## 2022-10-06 DIAGNOSIS — M25.571 ACUTE RIGHT ANKLE PAIN: Primary | ICD-10-CM

## 2022-10-06 DIAGNOSIS — S82.61XA CLOSED AVULSION FRACTURE OF LATERAL MALLEOLUS OF RIGHT FIBULA, INITIAL ENCOUNTER: ICD-10-CM

## 2022-10-06 DIAGNOSIS — M79.671 RIGHT FOOT PAIN: ICD-10-CM

## 2022-10-06 PROCEDURE — 1159F MED LIST DOCD IN RCRD: CPT | Mod: CPTII,S$GLB,,

## 2022-10-06 PROCEDURE — 3008F BODY MASS INDEX DOCD: CPT | Mod: CPTII,S$GLB,,

## 2022-10-06 PROCEDURE — 1160F RVW MEDS BY RX/DR IN RCRD: CPT | Mod: CPTII,S$GLB,,

## 2022-10-06 PROCEDURE — 73610 XR ANKLE COMPLETE 3 VIEW RIGHT: ICD-10-PCS | Mod: RT,S$GLB,, | Performed by: RADIOLOGY

## 2022-10-06 PROCEDURE — 99213 PR OFFICE/OUTPT VISIT, EST, LEVL III, 20-29 MIN: ICD-10-PCS | Mod: S$GLB,,,

## 2022-10-06 PROCEDURE — 3077F SYST BP >= 140 MM HG: CPT | Mod: CPTII,S$GLB,,

## 2022-10-06 PROCEDURE — 73610 X-RAY EXAM OF ANKLE: CPT | Mod: RT,S$GLB,, | Performed by: RADIOLOGY

## 2022-10-06 PROCEDURE — 99213 OFFICE O/P EST LOW 20 MIN: CPT | Mod: S$GLB,,,

## 2022-10-06 PROCEDURE — 3008F PR BODY MASS INDEX (BMI) DOCUMENTED: ICD-10-PCS | Mod: CPTII,S$GLB,,

## 2022-10-06 PROCEDURE — 73630 X-RAY EXAM OF FOOT: CPT | Mod: RT,S$GLB,, | Performed by: RADIOLOGY

## 2022-10-06 PROCEDURE — 3077F PR MOST RECENT SYSTOLIC BLOOD PRESSURE >= 140 MM HG: ICD-10-PCS | Mod: CPTII,S$GLB,,

## 2022-10-06 PROCEDURE — 3078F PR MOST RECENT DIASTOLIC BLOOD PRESSURE < 80 MM HG: ICD-10-PCS | Mod: CPTII,S$GLB,,

## 2022-10-06 PROCEDURE — 1160F PR REVIEW ALL MEDS BY PRESCRIBER/CLIN PHARMACIST DOCUMENTED: ICD-10-PCS | Mod: CPTII,S$GLB,,

## 2022-10-06 PROCEDURE — 1159F PR MEDICATION LIST DOCUMENTED IN MEDICAL RECORD: ICD-10-PCS | Mod: CPTII,S$GLB,,

## 2022-10-06 PROCEDURE — 73630 XR FOOT COMPLETE 3 VIEW RIGHT: ICD-10-PCS | Mod: RT,S$GLB,, | Performed by: RADIOLOGY

## 2022-10-06 PROCEDURE — 3078F DIAST BP <80 MM HG: CPT | Mod: CPTII,S$GLB,,

## 2022-10-06 RX ORDER — IBUPROFEN 600 MG/1
600 TABLET ORAL 3 TIMES DAILY
Qty: 30 TABLET | Refills: 0 | Status: SHIPPED | OUTPATIENT
Start: 2022-10-06

## 2022-10-06 RX ORDER — KETOROLAC TROMETHAMINE 30 MG/ML
30 INJECTION, SOLUTION INTRAMUSCULAR; INTRAVENOUS
Status: COMPLETED | OUTPATIENT
Start: 2022-10-06 | End: 2022-10-06

## 2022-10-06 RX ORDER — KETOROLAC TROMETHAMINE 30 MG/ML
30 INJECTION, SOLUTION INTRAMUSCULAR; INTRAVENOUS
Status: DISCONTINUED | OUTPATIENT
Start: 2022-10-06 | End: 2022-10-06

## 2022-10-06 RX ADMIN — KETOROLAC TROMETHAMINE 30 MG: 30 INJECTION, SOLUTION INTRAMUSCULAR; INTRAVENOUS at 12:10

## 2022-10-06 NOTE — PROGRESS NOTES
Subjective:       Patient ID: Petrona Perdomo is a 37 y.o. female.    Vitals:  weight is 90.7 kg (200 lb). Her temperature is 98.2 °F (36.8 °C). Her blood pressure is 141/71 (abnormal) and her pulse is 83. Her respiration is 18 and oxygen saturation is 97%.     Chief Complaint: Ankle Pain    Patient is a 37-year-old female who presents with right foot and ankle pain and swelling x1 week.  States that she was turning around while she was standing up and twisted her ankle a certain way.  Started feeling pain that night.  Sometimes gets tingling in her toes.  Pain worse with weight-bearing and ambulation.  Has tried ice packs and Tylenol without significant relief.  She denies any fever, erythema, ecchymosis, rash, open wounds.  History of diabetes.  No history of gout or arthritis.    Ankle Pain   The incident occurred more than 1 week ago. The incident occurred at home. The injury mechanism was a twisting injury. The pain is present in the left ankle. The quality of the pain is described as burning (throbbing). The pain is at a severity of 10/10. The pain is severe. The pain has been Constant since onset. Associated symptoms include tingling. She reports no foreign bodies present. The symptoms are aggravated by weight bearing. She has tried acetaminophen for the symptoms. The treatment provided no relief.     Constitution: Negative for chills and fever.   Gastrointestinal:  Negative for nausea and vomiting.   Musculoskeletal:  Positive for joint pain and joint swelling. Negative for arthritis and gout.   Skin:  Negative for rash, erythema and bruising.   Neurological:  Positive for tingling.     Objective:      Physical Exam   Constitutional: She is oriented to person, place, and time. She appears well-developed.   HENT:   Head: Normocephalic and atraumatic. Head is without abrasion, without contusion and without laceration.   Ears:   Right Ear: External ear normal.   Left Ear: External ear normal.   Nose: Nose  normal.   Mouth/Throat: Oropharynx is clear and moist and mucous membranes are normal.   Eyes: Conjunctivae, EOM and lids are normal. Pupils are equal, round, and reactive to light.   Neck: Trachea normal and phonation normal. Neck supple.   Cardiovascular: Normal rate, regular rhythm and normal heart sounds.   Pulmonary/Chest: Effort normal and breath sounds normal. No stridor. No respiratory distress.   Musculoskeletal: Normal range of motion.         General: Swelling and tenderness present. Normal range of motion.      Right lower leg: No edema.      Left lower leg: No edema.        Feet:    Neurological: She is alert and oriented to person, place, and time.   Skin: Skin is warm, dry, intact and no rash. Capillary refill takes less than 2 seconds. No abrasion, No burn, No bruising, No erythema and No ecchymosis   Psychiatric: Her speech is normal and behavior is normal. Judgment and thought content normal.   Nursing note and vitals reviewed.      X-Ray Ankle Complete 3 View Right    Result Date: 10/6/2022  EXAMINATION: XR ANKLE COMPLETE 3 VIEW RIGHT CLINICAL HISTORY: Pain in right ankle and joints of right foot TECHNIQUE: XR ANKLE COMPLETE 3 VIEW RIGHT COMPARISON: None FINDINGS: There is soft tissue swelling of the lateral malleolus.  There is cortical avulsion of the inferior tip of the lateral malleolus compatible with capsular and or ligamentous avulsion.  The ankle mortise remains congruent.  The there is some medial tibiotalar joint arthritic spurring.  Calcaneal spur is present.  Atherosclerotic vascular calcifications are present and there is lower leg soft tissue calcification of chronic venous stasis.     Nondisplaced capsular/ligamentous avulsion fracture of the inferior tip of the lateral malleolus Mild medial tibiotalar osteoarthritis Calcaneal spur Electronically signed by: Luis Daniel Mejía Jr Date:    10/06/2022 Time:    12:11    X-Ray Foot Complete 3 view Right    Result Date:  10/6/2022  EXAMINATION: XR FOOT COMPLETE 3 VIEW RIGHT CLINICAL HISTORY: Pain in right foot TECHNIQUE: XR FOOT COMPLETE 3 VIEW RIGHT COMPARISON: None FINDINGS: There is soft tissue swelling of the forefoot.  Hammertoe deformities of digits 2 through 5 and plantar calcaneal spurring is noted.  There is no evidence of fracture or significant joint space narrowing.     As above Electronically signed by: Luis Daniel Mejía Jr Date:    10/06/2022 Time:    12:09     Assessment:       1. Acute right ankle pain    2. Right foot pain    3. Closed avulsion fracture of lateral malleolus of right fibula, initial encounter          Plan:         Acute right ankle pain  -     Cancel: X-Ray Ankle Complete 3 View Left; Future; Expected date: 10/06/2022  -     X-Ray Ankle Complete 3 View Right; Future; Expected date: 10/06/2022  -     Discontinue: ketorolac injection 30 mg  -     ketorolac injection 30 mg  -     ibuprofen (ADVIL,MOTRIN) 600 MG tablet; Take 1 tablet (600 mg total) by mouth 3 (three) times daily.  Dispense: 30 tablet; Refill: 0    Right foot pain  -     Cancel: X-Ray Foot Complete 3 view Left; Future; Expected date: 10/06/2022  -     X-Ray Foot Complete 3 view Right; Future; Expected date: 10/06/2022  -     Discontinue: ketorolac injection 30 mg    Closed avulsion fracture of lateral malleolus of right fibula, initial encounter  -     CRUTCHES FOR HOME USE  -     Splint application; Future  -     Ambulatory referral/consult to Orthopedics                 Patient Instructions    PLEASE READ YOUR DISCHARGE INSTRUCTIONS ENTIRELY AS IT CONTAINS IMPORTANT INFORMATION.     Take Tylenol and ibuprofen for pain if not allergic to. Antiinflammatories like Ibuprofen, Advil, Aleve, Motrin, Naproxen, Meloxicam should not be taken in case of stomach ulcers, kidney disease or if on blood thinners. These medicines should always be taken with food. These medicine can irritate stomach. You can take an over the counter antacid with it  (prilosec, Nexium, Pepcid) to protect your stomach.      Rest, ice, compress at home     Avoid prolonged use of the affected area until better.      Please return or see your primary care doctor if you develop new or worsening symptoms.      You must understand that you've received an Urgent Care treatment only and that you may be released before all of your medical problems are known or treated. You, the patient, will arrange for follow up as instructed. If your symptoms worsen or fail to improve you should go to the Emergency Room.     If you are give a referral to specialist, please conform your appointment by calling [phone].

## 2022-10-06 NOTE — PATIENT INSTRUCTIONS
PLEASE READ YOUR DISCHARGE INSTRUCTIONS ENTIRELY AS IT CONTAINS IMPORTANT INFORMATION.     Take Tylenol and ibuprofen for pain if not allergic to. Antiinflammatories like Ibuprofen, Advil, Aleve, Motrin, Naproxen, Meloxicam should not be taken in case of stomach ulcers, kidney disease or if on blood thinners. These medicines should always be taken with food. These medicine can irritate stomach. You can take an over the counter antacid with it (prilosec, Nexium, Pepcid) to protect your stomach.      Rest, ice, compress at home     Avoid prolonged use of the affected area until better.      Please return or see your primary care doctor if you develop new or worsening symptoms.      You must understand that you've received an Urgent Care treatment only and that you may be released before all of your medical problems are known or treated. You, the patient, will arrange for follow up as instructed. If your symptoms worsen or fail to improve you should go to the Emergency Room.     If you are give a referral to specialist, please conform your appointment by calling [phone].

## 2022-10-07 ENCOUNTER — TELEPHONE (OUTPATIENT)
Dept: ORTHOPEDICS | Facility: CLINIC | Age: 37
End: 2022-10-07
Payer: MEDICAID

## 2022-10-07 NOTE — TELEPHONE ENCOUNTER
Called pt to let her know that we had no opening at the moment for new medicaid pt but I did refer her to [facility].I left a detailed message.

## 2022-10-07 NOTE — TELEPHONE ENCOUNTER
----- Message from Maria Del Carmen Dumont MD sent at 10/7/2022 12:32 PM CDT -----  Regarding: RE: RT Fib fx  Contact: Ambulatory referral/consult to Orthopedics  I can see her   It is not urgent - sometime in the next 1-2 weeks is fine   ----- Message -----  From: Do Ninoska MA  Sent: 10/6/2022   4:25 PM CDT  To: Maria Del Carmen Dumont MD  Subject: RT Fib fx                                        Would you like to see this patient?   ----- Message -----  From: Ann Vega  Sent: 10/6/2022   4:23 PM CDT  To: [facility] Staff    .Type:  Sooner Appointment Request    Caller is requesting a sooner appointment.  Caller declined first available appointment listed below.  Caller will not accept being placed on the waitlist and is requesting a message be sent to doctor.  Name of Caller: JOANN MAHMOOD- [facility] message   When is the first available appointment?   Symptoms: Closed avulsion fracture of lateral malleolus of right fibula, initial encounter  Would the patient rather a call back or a response via [facility]? Callback   Best Call Back Number:.[phone] (home)      Additional Information: referral # [id number]

## 2022-10-07 NOTE — TELEPHONE ENCOUNTER
----- Message from Patricia Pereira LPN sent at 10/7/2022 11:07 AM CDT -----  Regarding: FW: appt  Contact: Pt    ----- Message -----  From: Tawnya Cruz, Patient Care Assistant  Sent: 10/7/2022   9:19 AM CDT  To: [facility] Staff  Subject: appt                                             Pt is requesting a call back in regards to scheduling an appt. Pt is requesting call back before scheduling a date. Pt would like to confirm date and time when scheduling. No availability on my end to schedule for pt. Pt has a referral and states she needs to be seen as soon as possible.        Pt @ [phone]

## 2022-10-15 ENCOUNTER — HOSPITAL ENCOUNTER (EMERGENCY)
Facility: HOSPITAL | Age: 37
Discharge: HOME | End: 2022-10-15
Attending: EMERGENCY MEDICINE
Payer: MEDICAID

## 2022-10-15 VITALS
RESPIRATION RATE: 19 BRPM | OXYGEN SATURATION: 98 % | DIASTOLIC BLOOD PRESSURE: 92 MMHG | SYSTOLIC BLOOD PRESSURE: 155 MMHG | HEIGHT: 60 IN | HEART RATE: 101 BPM | WEIGHT: 178 LBS | BODY MASS INDEX: 34.95 KG/M2 | TEMPERATURE: 98 F

## 2022-10-15 DIAGNOSIS — S82.839A AVULSION FRACTURE OF DISTAL FIBULA: Primary | ICD-10-CM

## 2022-10-15 PROCEDURE — 99284 EMERGENCY DEPT VISIT MOD MDM: CPT | Mod: ,,, | Performed by: PHYSICIAN ASSISTANT

## 2022-10-15 PROCEDURE — 99284 PR EMERGENCY DEPT VISIT,LEVEL IV: ICD-10-PCS | Mod: ,,, | Performed by: PHYSICIAN ASSISTANT

## 2022-10-15 PROCEDURE — 99282 EMERGENCY DEPT VISIT SF MDM: CPT

## 2022-10-15 NOTE — ED TRIAGE NOTES
The pt is a 37-year-old female who presents to the ED from home via personal transportation. The pt reports that she had an injury to her right ankle from a fall two weeks ago. Went to urgent care at the time and was told that she had a fracture or a tear. States she is having trouble seeing an orthopedist due to insurance issues and was advised to go to the ED. Reports 10/10 pain and that she is ambulating on her toes because bending her ankle exacerbates the pain.

## 2022-10-15 NOTE — ED NOTES
Patient identifiers for Petrona Perdomo 37 y.o. female checked and correct.  Chief Complaint   Patient presents with    Foot Injury     Fell 2 weeks ago, R ankle still hurting. Was seen at urgent care and was told she sprained R foot      Past Medical History:   Diagnosis Date    Diabetes mellitus      Allergies reported:   Review of patient's allergies indicates:   Allergen Reactions    Bactrim [sulfamethoxazole-trimethoprim] Rash         LOC: Patient is awake, alert, and aware of environment with an appropriate affect. Patient is oriented x 4 and speaking appropriately.  APPEARANCE: Patient resting comfortably and in no acute distress. Patient is clean and well groomed, patient's clothing is properly fastened.  HEENT: Pt presents with surgical mask on.   SKIN: The skin is warm and dry. Patient has normal skin turgor and moist mucus membranes.   MUSKULOSKELETAL: Patient is moving all extremities well with the exception of the right ankle. No obvious deformities noted. Pulses intact. Pt reports right ankle pain and was diagnosed with either a fracture or tear at an urgent care.   RESPIRATORY: Airway is open and patent. Respirations are spontaneous and non-labored with normal effort and rate.  CARDIAC: Patient has an elevated rate and rhythm. 101 on cardiac monitor. Swelling noted to the right ankle.  ABDOMEN: No distention noted. Soft and non-tender upon palpation.  NEUROLOGICAL: Facial expression is symmetrical. Hand grasps are equal bilaterally. Normal sensation in all extremities when touched with finger.

## 2022-10-15 NOTE — ED PROVIDER NOTES
Encounter Date: 10/15/2022       History     Chief Complaint   Patient presents with    Foot Injury     Fell 2 weeks ago, R ankle still hurting. Was seen at urgent care and was told she sprained R foot      37-year-old female presents to the ED for evaluation of right foot and ankle pain for the past 2 weeks following an injury.  Per chart, patient was diagnosed with an avulsion fracture of the distal fibula.  Patient has had difficulties getting into an orthopedic surgery clinic that takes her Medicaid insurance.  Patient has also been putting weight on the foot because it is sometimes difficult ambulating with the crutches.  She occasionally has numbness in the foot.  No other complaints.    Review of patient's allergies indicates:   Allergen Reactions    Bactrim [sulfamethoxazole-trimethoprim] Rash     Past Medical History:   Diagnosis Date    Diabetes mellitus      No past surgical history on file.  No family history on file.  Social History     Tobacco Use    Smoking status: Never    Smokeless tobacco: Never   Substance Use Topics    Alcohol use: No    Drug use: No     Review of Systems   Constitutional:  Negative for fever.   HENT:  Negative for sore throat.    Respiratory:  Negative for shortness of breath.    Cardiovascular:  Negative for chest pain.   Gastrointestinal:  Negative for nausea.   Genitourinary:  Negative for dysuria.   Musculoskeletal:  Negative for back pain.        Right foot and ankle pain   Skin:  Negative for rash.   Neurological:  Negative for weakness.   Hematological:  Does not bruise/bleed easily.     Physical Exam     Initial Vitals [10/15/22 1106]   BP Pulse Resp Temp SpO2   (!) 155/92 101 19 98 °F (36.7 °C) 98 %      MAP       --         Physical Exam    Nursing note and vitals reviewed.  Constitutional: She appears well-developed and well-nourished. She is not diaphoretic.  Non-toxic appearance. She does not appear ill. No distress.   HENT:   Head: Normocephalic and atraumatic.    Neck: Neck supple.   Cardiovascular:  Normal rate and regular rhythm.     Exam reveals no gallop and no friction rub.       No murmur heard.  Pulmonary/Chest: Effort normal and breath sounds normal. No accessory muscle usage. No tachypnea. No respiratory distress. She has no decreased breath sounds. She has no wheezes. She has no rhonchi. She has no rales.   Abdominal: She exhibits no distension.   Musculoskeletal:      Cervical back: Neck supple.      Comments: No swelling or deformity noted to the right foot or ankle.  Normal sensation.  DP pulse intact.  Tenderness to the lateral malleolus.  No erythema, warmth.     Neurological: She is alert.   Skin: No rash noted.   Psychiatric: She has a normal mood and affect. Her behavior is normal.       ED Course   Procedures  Labs Reviewed - No data to display       Imaging Results    None          Medications - No data to display  Medical Decision Making:   History:   Old Medical Records: I decided to obtain old medical records.  Initial Assessment:   37-year-old female presents to the ED with persistent right ankle and foot pain and difficulty seeing an orthopedic surgeon after an ankle injury 2 weeks ago.  She is neurovascularly intact.  Differential Diagnosis:   My differential diagnosis includes but is not limited to:  Fracture, contusion, sprain, dislocation   ED Management:  I will provide patient with a walking boot.  I have placed a referral to [facility] Orth[facility].  I have also provided contact information for [facility] orthopedic clinic for follow-up.  Patient advised to keep the foot elevated when possible and limit weight-bearing to reduce pain and swelling.  Stable for discharge.                        Clinical Impression:   Final diagnoses:  [I10.229W] Avulsion fracture of distal fibula (Primary)        ED Disposition Condition    Discharge Stable          ED Prescriptions    None       Follow-up Information       Follow up With Specialties  Details Why Contact Info Additional Information    [facility] Schedule an appointment as soon as possible for a visit   [address]  [facility] [address] [phone] Beginning October 17th, the [facility] [place] [facility] will be closed for renovations. Please park and enter the facility through the front entrance of the hospital. Please enter through the Clinic entrance located on the right side of the hospital. Please check-in at the Information Desk.           [facility] Surgical Oncology, Orthopedic Surgery, Genetics, Physical Medicine and Rehabilitation, Occupational Therapy, Radiology Schedule an appointment as soon as possible for a visit   [address] [facility] [address]  [phone]                Fabi Kan PA-C  10/15/22 5029

## 2022-10-15 NOTE — DISCHARGE INSTRUCTIONS
Follow-up in orthopedics clinic at [facility] or at [facility] for further management of your ankle fracture.  Keep the leg elevated when possible to reduce pain and swelling.    Return to the ER if you develop any new or significantly worsening symptoms, persistent numbness in feet or toes, worsening swelling, fever greater than 100.4° or any other worrisome symptoms.

## 2023-05-21 ENCOUNTER — HOSPITAL ENCOUNTER (EMERGENCY)
Facility: HOSPITAL | Age: 38
Discharge: HOME | End: 2023-05-22
Attending: EMERGENCY MEDICINE
Payer: MEDICAID

## 2023-05-21 DIAGNOSIS — R11.2 NAUSEA AND VOMITING, UNSPECIFIED VOMITING TYPE: ICD-10-CM

## 2023-05-21 DIAGNOSIS — N12 PYELONEPHRITIS: Primary | ICD-10-CM

## 2023-05-21 DIAGNOSIS — Z34.90 PREGNANCY: ICD-10-CM

## 2023-05-21 LAB
ALBUMIN SERPL BCP-MCNC: 3 G/DL (ref 3.5–5.2)
ALP SERPL-CCNC: 86 U/L (ref 55–135)
ALT SERPL W/O P-5'-P-CCNC: 8 U/L (ref 10–44)
ANION GAP SERPL CALC-SCNC: 11 MMOL/L (ref 8–16)
AST SERPL-CCNC: 10 U/L (ref 10–40)
B-HCG UR QL: POSITIVE
BACTERIA #/AREA URNS AUTO: ABNORMAL /HPF
BACTERIA GENITAL QL WET PREP: ABNORMAL
BASOPHILS # BLD AUTO: 0.05 K/UL (ref 0–0.2)
BASOPHILS NFR BLD: 0.4 % (ref 0–1.9)
BILIRUB SERPL-MCNC: 0.6 MG/DL (ref 0.1–1)
BILIRUB UR QL STRIP: NEGATIVE
BUN SERPL-MCNC: 17 MG/DL (ref 6–20)
BUN SERPL-MCNC: 18 MG/DL (ref 6–30)
CALCIUM SERPL-MCNC: 8.4 MG/DL (ref 8.7–10.5)
CHLORIDE SERPL-SCNC: 95 MMOL/L (ref 95–110)
CHLORIDE SERPL-SCNC: 96 MMOL/L (ref 95–110)
CLARITY UR REFRACT.AUTO: ABNORMAL
CLUE CELLS VAG QL WET PREP: ABNORMAL
CO2 SERPL-SCNC: 24 MMOL/L (ref 23–29)
COLOR UR AUTO: ABNORMAL
CREAT SERPL-MCNC: 0.7 MG/DL (ref 0.5–1.4)
CREAT SERPL-MCNC: 0.9 MG/DL (ref 0.5–1.4)
CTP QC/QA: YES
DIFFERENTIAL METHOD: ABNORMAL
EOSINOPHIL # BLD AUTO: 0.1 K/UL (ref 0–0.5)
EOSINOPHIL NFR BLD: 0.4 % (ref 0–8)
ERYTHROCYTE [DISTWIDTH] IN BLOOD BY AUTOMATED COUNT: 11 % (ref 11.5–14.5)
EST. GFR  (NO RACE VARIABLE): >60 ML/MIN/1.73 M^2
FILAMENT FUNGI VAG WET PREP-#/AREA: ABNORMAL
GLUCOSE SERPL-MCNC: 263 MG/DL (ref 70–110)
GLUCOSE SERPL-MCNC: 267 MG/DL (ref 70–110)
GLUCOSE UR QL STRIP: ABNORMAL
HCG INTACT+B SERPL-ACNC: NORMAL MIU/ML
HCT VFR BLD AUTO: 32.9 % (ref 37–48.5)
HCT VFR BLD CALC: 34 %PCV (ref 36–54)
HCV AB SERPL QL IA: NORMAL
HGB BLD-MCNC: 11.3 G/DL (ref 12–16)
HGB UR QL STRIP: ABNORMAL
HIV 1+2 AB+HIV1 P24 AG SERPL QL IA: NORMAL
HYALINE CASTS UR QL AUTO: 0 /LPF
IMM GRANULOCYTES # BLD AUTO: 0.05 K/UL (ref 0–0.04)
IMM GRANULOCYTES NFR BLD AUTO: 0.4 % (ref 0–0.5)
KETONES UR QL STRIP: ABNORMAL
LEUKOCYTE ESTERASE UR QL STRIP: ABNORMAL
LIPASE SERPL-CCNC: 16 U/L (ref 4–60)
LYMPHOCYTES # BLD AUTO: 3 K/UL (ref 1–4.8)
LYMPHOCYTES NFR BLD: 22.8 % (ref 18–48)
MCH RBC QN AUTO: 29.9 PG (ref 27–31)
MCHC RBC AUTO-ENTMCNC: 34.3 G/DL (ref 32–36)
MCV RBC AUTO: 87 FL (ref 82–98)
MICROSCOPIC COMMENT: ABNORMAL
MONOCYTES # BLD AUTO: 0.7 K/UL (ref 0.3–1)
MONOCYTES NFR BLD: 5.5 % (ref 4–15)
NEUTROPHILS # BLD AUTO: 9.2 K/UL (ref 1.8–7.7)
NEUTROPHILS NFR BLD: 70.5 % (ref 38–73)
NITRITE UR QL STRIP: POSITIVE
NRBC BLD-RTO: 0 /100 WBC
PH UR STRIP: 6 [PH] (ref 5–8)
PLATELET # BLD AUTO: 299 K/UL (ref 150–450)
PMV BLD AUTO: 10 FL (ref 9.2–12.9)
POC IONIZED CALCIUM: 1.05 MMOL/L (ref 1.06–1.42)
POC TCO2 (MEASURED): 24 MMOL/L (ref 23–29)
POCT GLUCOSE: 250 MG/DL (ref 70–110)
POTASSIUM BLD-SCNC: 3.6 MMOL/L (ref 3.5–5.1)
POTASSIUM SERPL-SCNC: 3.6 MMOL/L (ref 3.5–5.1)
PROT SERPL-MCNC: 6.8 G/DL (ref 6–8.4)
PROT UR QL STRIP: ABNORMAL
RBC # BLD AUTO: 3.78 M/UL (ref 4–5.4)
RBC #/AREA URNS AUTO: 6 /HPF (ref 0–4)
SAMPLE: ABNORMAL
SARS-COV-2 RDRP RESP QL NAA+PROBE: NEGATIVE
SODIUM BLD-SCNC: 131 MMOL/L (ref 136–145)
SODIUM SERPL-SCNC: 131 MMOL/L (ref 136–145)
SP GR UR STRIP: 1.02 (ref 1–1.03)
SPECIMEN SOURCE: ABNORMAL
T VAGINALIS GENITAL QL WET PREP: ABNORMAL
URN SPEC COLLECT METH UR: ABNORMAL
WBC # BLD AUTO: 13.09 K/UL (ref 3.9–12.7)
WBC #/AREA URNS AUTO: 81 /HPF (ref 0–5)
WBC #/AREA VAG WET PREP: ABNORMAL
WBC CLUMPS UR QL AUTO: ABNORMAL
YEAST GENITAL QL WET PREP: ABNORMAL
YEAST UR QL AUTO: ABNORMAL

## 2023-05-21 PROCEDURE — U0002 COVID-19 LAB TEST NON-CDC: HCPCS | Performed by: EMERGENCY MEDICINE

## 2023-05-21 PROCEDURE — 80053 COMPREHEN METABOLIC PANEL: CPT | Performed by: EMERGENCY MEDICINE

## 2023-05-21 PROCEDURE — 99284 PR EMERGENCY DEPT VISIT,LEVEL IV: ICD-10-PCS | Mod: ,,, | Performed by: EMERGENCY MEDICINE

## 2023-05-21 PROCEDURE — 87086 URINE CULTURE/COLONY COUNT: CPT | Performed by: EMERGENCY MEDICINE

## 2023-05-21 PROCEDURE — 87389 HIV-1 AG W/HIV-1&-2 AB AG IA: CPT | Performed by: PHYSICIAN ASSISTANT

## 2023-05-21 PROCEDURE — 84702 CHORIONIC GONADOTROPIN TEST: CPT | Performed by: EMERGENCY MEDICINE

## 2023-05-21 PROCEDURE — 96376 TX/PRO/DX INJ SAME DRUG ADON: CPT

## 2023-05-21 PROCEDURE — 96365 THER/PROPH/DIAG IV INF INIT: CPT

## 2023-05-21 PROCEDURE — 81001 URINALYSIS AUTO W/SCOPE: CPT | Performed by: EMERGENCY MEDICINE

## 2023-05-21 PROCEDURE — 85025 COMPLETE CBC W/AUTO DIFF WBC: CPT | Performed by: EMERGENCY MEDICINE

## 2023-05-21 PROCEDURE — 81025 URINE PREGNANCY TEST: CPT | Performed by: EMERGENCY MEDICINE

## 2023-05-21 PROCEDURE — 87591 N.GONORRHOEAE DNA AMP PROB: CPT | Performed by: EMERGENCY MEDICINE

## 2023-05-21 PROCEDURE — 99285 EMERGENCY DEPT VISIT HI MDM: CPT | Mod: 25

## 2023-05-21 PROCEDURE — 25000003 PHARM REV CODE 250: Performed by: EMERGENCY MEDICINE

## 2023-05-21 PROCEDURE — 87186 SC STD MICRODIL/AGAR DIL: CPT | Performed by: EMERGENCY MEDICINE

## 2023-05-21 PROCEDURE — 99284 EMERGENCY DEPT VISIT MOD MDM: CPT | Mod: ,,, | Performed by: EMERGENCY MEDICINE

## 2023-05-21 PROCEDURE — 87088 URINE BACTERIA CULTURE: CPT | Performed by: EMERGENCY MEDICINE

## 2023-05-21 PROCEDURE — 86803 HEPATITIS C AB TEST: CPT | Performed by: PHYSICIAN ASSISTANT

## 2023-05-21 PROCEDURE — 96375 TX/PRO/DX INJ NEW DRUG ADDON: CPT

## 2023-05-21 PROCEDURE — 63600175 PHARM REV CODE 636 W HCPCS: Performed by: EMERGENCY MEDICINE

## 2023-05-21 PROCEDURE — 96361 HYDRATE IV INFUSION ADD-ON: CPT

## 2023-05-21 PROCEDURE — 87210 SMEAR WET MOUNT SALINE/INK: CPT | Performed by: EMERGENCY MEDICINE

## 2023-05-21 PROCEDURE — 87077 CULTURE AEROBIC IDENTIFY: CPT | Performed by: EMERGENCY MEDICINE

## 2023-05-21 PROCEDURE — 83690 ASSAY OF LIPASE: CPT | Performed by: EMERGENCY MEDICINE

## 2023-05-21 RX ORDER — ONDANSETRON 2 MG/ML
4 INJECTION INTRAMUSCULAR; INTRAVENOUS
Status: COMPLETED | OUTPATIENT
Start: 2023-05-21 | End: 2023-05-21

## 2023-05-21 RX ORDER — ACETAMINOPHEN 325 MG/1
650 TABLET ORAL
Status: COMPLETED | OUTPATIENT
Start: 2023-05-21 | End: 2023-05-21

## 2023-05-21 RX ORDER — CEPHALEXIN 500 MG/1
500 CAPSULE ORAL
Status: COMPLETED | OUTPATIENT
Start: 2023-05-21 | End: 2023-05-21

## 2023-05-21 RX ORDER — MORPHINE SULFATE 2 MG/ML
6 INJECTION, SOLUTION INTRAMUSCULAR; INTRAVENOUS
Status: DISCONTINUED | OUTPATIENT
Start: 2023-05-21 | End: 2023-05-21

## 2023-05-21 RX ADMIN — ONDANSETRON 4 MG: 2 INJECTION INTRAMUSCULAR; INTRAVENOUS at 09:05

## 2023-05-21 RX ADMIN — CEPHALEXIN 500 MG: 500 CAPSULE ORAL at 10:05

## 2023-05-21 RX ADMIN — SODIUM CHLORIDE, POTASSIUM CHLORIDE, SODIUM LACTATE AND CALCIUM CHLORIDE 1000 ML: 600; 310; 30; 20 INJECTION, SOLUTION INTRAVENOUS at 09:05

## 2023-05-21 RX ADMIN — ACETAMINOPHEN 650 MG: 325 TABLET ORAL at 09:05

## 2023-05-22 VITALS
WEIGHT: 175 LBS | DIASTOLIC BLOOD PRESSURE: 80 MMHG | HEART RATE: 83 BPM | SYSTOLIC BLOOD PRESSURE: 142 MMHG | TEMPERATURE: 98 F | BODY MASS INDEX: 34.18 KG/M2 | RESPIRATION RATE: 16 BRPM | OXYGEN SATURATION: 100 %

## 2023-05-22 LAB
ABO + RH BLD: NORMAL
BLD GP AB SCN CELLS X3 SERPL QL: NORMAL
SPECIMEN OUTDATE: NORMAL

## 2023-05-22 PROCEDURE — 86900 BLOOD TYPING SEROLOGIC ABO: CPT | Performed by: EMERGENCY MEDICINE

## 2023-05-22 PROCEDURE — 63600175 PHARM REV CODE 636 W HCPCS: Performed by: EMERGENCY MEDICINE

## 2023-05-22 PROCEDURE — 25000003 PHARM REV CODE 250: Performed by: EMERGENCY MEDICINE

## 2023-05-22 RX ORDER — ONDANSETRON 2 MG/ML
4 INJECTION INTRAMUSCULAR; INTRAVENOUS
Status: COMPLETED | OUTPATIENT
Start: 2023-05-22 | End: 2023-05-22

## 2023-05-22 RX ORDER — ONDANSETRON 4 MG/1
4 TABLET, ORALLY DISINTEGRATING ORAL EVERY 6 HOURS PRN
Qty: 12 TABLET | Refills: 0 | Status: SHIPPED | OUTPATIENT
Start: 2023-05-22 | End: 2023-05-24

## 2023-05-22 RX ORDER — MAG HYDROX/ALUMINUM HYD/SIMETH 200-200-20
5 SUSPENSION, ORAL (FINAL DOSE FORM) ORAL
Status: DISCONTINUED | OUTPATIENT
Start: 2023-05-22 | End: 2023-05-22 | Stop reason: HOSPADM

## 2023-05-22 RX ORDER — CEPHALEXIN 500 MG/1
500 CAPSULE ORAL 4 TIMES DAILY
Qty: 28 CAPSULE | Refills: 0 | Status: SHIPPED | OUTPATIENT
Start: 2023-05-22 | End: 2023-05-29

## 2023-05-22 RX ADMIN — ONDANSETRON 4 MG: 2 INJECTION INTRAMUSCULAR; INTRAVENOUS at 12:05

## 2023-05-22 RX ADMIN — PROMETHAZINE HYDROCHLORIDE 12.5 MG: 25 INJECTION, SOLUTION INTRAMUSCULAR; INTRAVENOUS at 02:05

## 2023-05-22 NOTE — ED PROVIDER NOTES
Encounter Date: 5/21/2023       History     Chief Complaint   Patient presents with    Emesis     Pt c/o N/V/D and subjective fever/chills since Thursday. Pt states unable to tolerate PO. Hx of diabetes.  in triage     39 yo W with pmhx IDDM presents with a chief complaint of nausea, vomiting, diarrhea, abdominal pain.  Symptoms have been ongoing for 48 hours.  She notes she was seen at  at onset of the symptoms and received Zofran and discharged.  She reports symptoms have persisted.  Pain is most severe in the left lower back.  Pain radiates around to her left lower quadrant.  That has been severe throughout the day today.  She reports subjective chills over the past few days.  No blood in vomit or diarrhea.  No suspicious food.  No dysuria of urinary frequency.  She denies a history of similar symptoms.      Review of patient's allergies indicates:   Allergen Reactions    Bactrim [sulfamethoxazole-trimethoprim] Rash     Past Medical History:   Diagnosis Date    Diabetes mellitus      History reviewed. No pertinent surgical history.  History reviewed. No pertinent family history.  Social History     Tobacco Use    Smoking status: Never    Smokeless tobacco: Never   Substance Use Topics    Alcohol use: No    Drug use: No     Review of Systems    Physical Exam     Initial Vitals [05/21/23 1920]   BP Pulse Resp Temp SpO2   126/73 89 20 98.5 °F (36.9 °C) 99 %      MAP       --         Physical Exam    Nursing note and vitals reviewed.  Constitutional: She appears well-developed and well-nourished. She is not diaphoretic. She appears distressed (Lying in left lateral decubitus).   HENT:   Head: Normocephalic and atraumatic.   Dry mucous membranes   Eyes: Right eye exhibits no discharge. Left eye exhibits no discharge. No scleral icterus.   Neck: Neck supple. No JVD present.   Normal range of motion.  Cardiovascular:  Normal rate, regular rhythm and normal heart sounds.     Exam reveals no gallop and no  friction rub.       No murmur heard.  Pulmonary/Chest: Breath sounds normal. No respiratory distress. She has no wheezes. She has no rhonchi. She has no rales. She exhibits no tenderness.   Abdominal: Abdomen is soft. Bowel sounds are normal. She exhibits no distension and no mass. There is abdominal tenderness (Generalized, most pronounced in left lower quadrant).   No right CVA tenderness.  No left CVA tenderness. There is no rebound and no guarding.   Genitourinary:    Pelvic exam was performed with patient supine.   Cervix exhibits no motion tenderness, no discharge and no friability. Right adnexum displays no mass, no tenderness and no fullness. Left adnexum displays no mass, no tenderness and no fullness.    Vaginal discharge present.      No vaginal tenderness or bleeding.   No tenderness or bleeding in the vagina.    No foreign body in the vagina.     Musculoskeletal:         General: No tenderness or edema. Normal range of motion.      Cervical back: Normal range of motion and neck supple.      Comments: No midline lumbar tenderness  Generalized tenderness over the left lateral lumbar region     Neurological: She is alert and oriented to person, place, and time. She has normal strength. No sensory deficit.   Skin: Skin is warm and dry. Capillary refill takes 2 to 3 seconds.   Psychiatric: Her mood appears anxious.       ED Course   Procedures  Labs Reviewed   VAGINAL SCREEN - Abnormal; Notable for the following components:       Result Value    Fungal Hyphae Occasional (*)     WBC - Vaginal Screen Occasional (*)     Bacteria - Vaginal Screen Many (*)     All other components within normal limits    Narrative:     Release to patient->Immediate   CBC W/ AUTO DIFFERENTIAL - Abnormal; Notable for the following components:    WBC 13.09 (*)     RBC 3.78 (*)     Hemoglobin 11.3 (*)     Hematocrit 32.9 (*)     RDW 11.0 (*)     Gran # (ANC) 9.2 (*)     Immature Grans (Abs) 0.05 (*)     All other components within  normal limits   COMPREHENSIVE METABOLIC PANEL - Abnormal; Notable for the following components:    Sodium 131 (*)     Glucose 263 (*)     Calcium 8.4 (*)     Albumin 3.0 (*)     ALT 8 (*)     All other components within normal limits   URINALYSIS, REFLEX TO URINE CULTURE - Abnormal; Notable for the following components:    Color, UA Orange (*)     Protein, UA 3+ (*)     Glucose, UA 4+ (*)     Ketones, UA 1+ (*)     Occult Blood UA 2+ (*)     Nitrite, UA Positive (*)     Leukocytes, UA 3+ (*)     All other components within normal limits    Narrative:     Specimen Source->Urine   URINALYSIS MICROSCOPIC - Abnormal; Notable for the following components:    RBC, UA 6 (*)     WBC, UA 81 (*)     WBC Clumps, UA Many (*)     Bacteria Many (*)     All other components within normal limits    Narrative:     Specimen Source->Urine   POCT URINE PREGNANCY - Abnormal; Notable for the following components:    POC Preg Test, Ur Positive (*)     All other components within normal limits   POCT GLUCOSE - Abnormal; Notable for the following components:    POCT Glucose 250 (*)     All other components within normal limits   ISTAT PROCEDURE - Abnormal; Notable for the following components:    POC Glucose 267 (*)     POC Sodium 131 (*)     POC Ionized Calcium 1.05 (*)     POC Hematocrit 34 (*)     All other components within normal limits   CULTURE, URINE   C. TRACHOMATIS/N. GONORRHOEAE BY AMP DNA   HIV 1 / 2 ANTIBODY    Narrative:     Release to patient->Immediate   HEPATITIS C ANTIBODY    Narrative:     Release to patient->Immediate   LIPASE   SARS-COV-2 RNA AMPLIFICATION, QUAL   HCG, QUANTITATIVE    Narrative:     Add on HCG per Dr. Viveros @ 21:43 pm to order # [id number]   TYPE & SCREEN          Imaging Results              US OB <14 Wks TransAbd & TransVag, Single Gestation (XPD) (Final result)  Result time 05/22/23 00:50:54   Procedure changed from US OB <14 Wks, TransAbd, Single Gestation     Final result by Eduard Crystal MD  (05/22/23 00:50:54)                   Impression:      Intrauterine gestational sac identified without an identifiable yolk sac, fetal pole or cardiac activity, which can be secondary to an early pregnancy or anembryonic pregnancy.  Follow-up ultrasound and beta HCG recommended.    Left ovarian cyst and free fluid in the cul-de-sac, as above.    Electronically signed by resident: Clyde Best  Date:    05/22/2023  Time:    00:23    Electronically signed by: Eduard Crystal MD  Date:    05/22/2023  Time:    00:50               Narrative:    EXAMINATION:  US OB <14 WEEKS, TRANSABDOM & TRANSVAG, SINGLE GESTATION (XPD)    CLINICAL HISTORY:  pregnancy; Encounter for supervision of normal pregnancy, unspecified, unspecified trimester    TECHNIQUE:  Transabdominal sonography of the pelvis was performed, followed by transvaginal sonography to better evaluate the uterus and ovaries.    COMPARISON:  None.    FINDINGS:  Intrauterine gestation(s): Single    Mean gestational sac diameter: 0.8 cm.  AUA calculated at 5 weeks and 3 days.    Yolk sac: Not visualized    Crown-rump length (CRL): N/a    Cardiac activity: Not visualized    Subchorionic hemorrhage: N/a    Uterus: Measures 7.0 x 4.3 x 5.3 cm.    Right ovary: Measures 3.6 x 1.7 x 2.9 cm with functional follicles.    Left ovary: Measures 4.8 x 3.2 x 4.1 cm with possible corpus luteal cyst.  3.5 cm anechoic lesion which may reflect a follicle or cyst.    Miscellaneous: Trace amount of fluid in the cul-de-sac.                                       Medications   promethazine (PHENERGAN) 12.5 mg in dextrose 5 % (D5W) 50 mL IVPB (has no administration in time range)   ondansetron injection 4 mg (4 mg Intravenous Given 5/21/23 2155)   lactated ringers bolus 1,000 mL (0 mLs Intravenous Stopped 5/21/23 2232)   acetaminophen tablet 650 mg (650 mg Oral Given 5/21/23 2156)   cephALEXin capsule 500 mg (500 mg Oral Given 5/21/23 2245)   ondansetron injection 4 mg (4 mg Intravenous  Given 23 0020)     Medical Decision Making:   History:   I obtained history from: someone other than patient.  Old Medical Records: I decided to obtain old medical records.  Initial Assessment:   37 yo W with pmhx IDDM presents with a chief complaint of nausea, vomiting, diarrhea, abdominal pain.   Differential Diagnosis:   Gastroenteritis, perforated viscus, DKA, nephrolithiasis, diverticulitis  Clinical Tests:   Lab Tests: Ordered  Radiological Study: Ordered  Medical Tests: Ordered  ED Management:  Will administer IV fluids, analgesics, antiemetics.  Will obtain labs and CT.    Reassessment:  Patient incidentally found to be pregnant.  She was informed of the findings.  This being the case, will discontinue morphine and CT.  Will administer acetaminophen.  She is now a .  She is at approximately 5 weeks, LMP 23.  Pelvic exam performed as above.  Bedside ultrasound revealed a gestational sac but no confirmed intrauterine pregnancy.  Will obtain beta HCG, type and screen and radiology performed ultrasound.               Reassessment:  Vaginal screen clear.  UA with 4+ glucose, 3+ protein, 1+ ketones, 2+ blood, positive nitrites, 3+ leukocyte, many WBC clumps, many bacteria, 6 RBCs, 81 wbc's.  Presentation is concerning for pyelonephritis.  Keflex administered.  Beta hCG 34435.  COVID is negative.  Ultrasound reveals a gestational sac without an identifiable yolk sac, fetal pole, cardiac activity.  Patient advised of my concern of an early pregnancy versus an embryonic pregnancy and the importance of follow-up with OB.  I reviewed imaging and laboratory findings with her.  She reports nausea persists.  Will administer Phenergan.  If that controls her symptoms, she should be stable for outpatient management of pyelonephritis.  At this time, my shift is coming to a close and the patient was signed out to incoming MD. if she feels improved, she may be stable for discharge.  Script for Keflex and Zofran  provided.  Instructions for symptomatic treatment.  Extensive return precautions.  Follow-up with OB.         Clinical Impression:   Final diagnoses:  [Z34.90] Pregnancy  [N12] Pyelonephritis (Primary)                   Keith Uribe MD  05/22/23 0101       Keith Uribe MD  05/22/23 0109

## 2023-05-22 NOTE — ED TRIAGE NOTES
Petrona Perdomo, a 38 y.o. female presents to the ED w/ complaint of emesis. Pt c/o N/V/D since thursday. Reports unable to eat anything. Hx of diabetes.    Adult Physical Assessment  LOC: Petrona Perdomo, 38 y.o. female verified via two identifiers.  The patient is awake, alert, oriented and speaking appropriately at this time.  APPEARANCE: Patient resting comfortably and appears to be in no acute distress at this time. Patient is clean and well groomed, patient's clothing is properly fastened.  SKIN:The skin is warm and dry, color consistent with ethnicity, patient has normal skin turgor and moist mucus membranes, skin intact, no breakdown or brusing noted.  MUSCULOSKELETAL: Patient moving all extremities well, no obvious swelling or deformities noted.  RESPIRATORY: Airway is open and patent, respirations are spontaneous, patient has a normal effort and rate, no accessory muscle use noted.  CARDIAC: Patient has a normal rate and rhythm, no periphreal edema noted in any extremity, capillary refill < 3 seconds in all extremities  ABDOMEN: Soft and non tender to palpation, no abdominal distention noted. Bowel sounds present in all four quadrants. Patient reports N/V/D since Thursday. Reports unable to take anything by mouth.   NEUROLOGIC: Eyes open spontaneously, behavior appropriate to situation, follows commands, facial expression symmetrical, bilateral hand grasp equal and even, purposeful motor response noted, normal sensation in all extremities when touched with a finger.      Triage note:  Chief Complaint   Patient presents with    Emesis     Pt c/o N/V/D and subjective fever/chills since Thursday. Pt states unable to tolerate PO. Hx of diabetes.  in triage     Review of patient's allergies indicates:   Allergen Reactions    Bactrim [sulfamethoxazole-trimethoprim] Rash     Past Medical History:   Diagnosis Date    Diabetes mellitus

## 2023-05-22 NOTE — PROVIDER PROGRESS NOTES - EMERGENCY DEPT.
Encounter Date: 5/21/2023    ED Physician Progress Notes        Physician Note:   ED Physician Hand-off Note:    ED Course: I assumed care of patient from off-going ED physician team. Briefly, Patient is a 38 F hx of IDDM here for nausea, vomiting, abdominal pain.    Labs reviewed with mild leukocytosis.  Glucose 263, AG 11.  UA with 3+ leukocytes, white cell clumps with many bacteria.  Patient was treated with Keflex, Zofran and now Phenergan for intractable nausea.      At the time of signout plan was pending p.o. challenge after Phenergan.    Medications given in the ED:    Medications  aluminum-magnesium hydroxide-simethicone 200-200-20 mg/5 mL suspension 5 mL (5 mLs Oral Not Given 5/22/23 0215)  ondansetron injection 4 mg (4 mg Intravenous Given 5/21/23 2155)  lactated ringers bolus 1,000 mL (0 mLs Intravenous Stopped 5/21/23 2232)  acetaminophen tablet 650 mg (650 mg Oral Given 5/21/23 2156)  cephALEXin capsule 500 mg (500 mg Oral Given 5/21/23 2245)  ondansetron injection 4 mg (4 mg Intravenous Given 5/22/23 0020)  promethazine (PHENERGAN) 12.5 mg in dextrose 5 % (D5W) 50 mL IVPB (0 mg Intravenous Stopped 5/22/23 0225)    On re-evaluation, she still reports burning pain in the epigastrium.  I have ordered Mylanta but she declined it.  She still feels nauseated    4:00 AM  I re-evaluated the patient at bedside.  Given water with some burping but no vomiting.  Will see if she can tolerate pudding.      5:00 AM  Patient tolerated p.o., reports improvement of nausea after eating.  Comfortable with plan for discharge and follow-up with OB.    Disposition: discharge    Patient comfortable with plan. Patient counseled regarding exam, results, diagnosis, treatment, and plan.    Impression: intractable nausea, leukocytosis, hyperglycemia, UTI.    MARYANN Fox MD  Staff ED Physician  05/22/2023 3:05 AM

## 2023-05-22 NOTE — ED NOTES
I-STAT Chem-8+ Results:   Value Reference Range   Sodium 131 136-145 mmol/L   Potassium  3.6 3.5-5.1 mmol/L   Chloride 95  mmol/L   Ionized Calcium 1.05 1.06-1.42 mmol/L   CO2 (measured) 24 23-29 mmol/L   Glucose 267  mg/dL   BUN 18 6-30 mg/dL   Creatinine 0.7 0.5-1.4 mg/dL   Hematocrit 34 36-54%

## 2023-05-22 NOTE — DISCHARGE INSTRUCTIONS
Your ultrasound reveals findings of early pregnancy but no confirmed pregnancy in your uterus.  You will need to follow-up with OB in the next 2-3 days.    Take Keflex to treat your kidney infection.  Take Zofran as needed for nausea.  Take Tylenol as needed for pain.    US OB <14 Wks TransAbd & TransVag, Single Gestation (XPD)   Final Result      Intrauterine gestational sac identified without an identifiable yolk sac, fetal pole or cardiac activity, which can be secondary to an early pregnancy or anembryonic pregnancy.  Follow-up ultrasound and beta HCG recommended.      Left ovarian cyst and free fluid in the cul-de-sac, as above.      Electronically signed by resident: Clyde Best   Date:    05/22/2023   Time:    00:23      Electronically signed by: Eduard Crystal MD   Date:    05/22/2023   Time:    00:50

## 2023-05-23 LAB
BACTERIA UR CULT: ABNORMAL
C TRACH DNA SPEC QL NAA+PROBE: NOT DETECTED
N GONORRHOEA DNA SPEC QL NAA+PROBE: NOT DETECTED
